# Patient Record
Sex: FEMALE | Race: WHITE | NOT HISPANIC OR LATINO | Employment: OTHER | URBAN - NONMETROPOLITAN AREA
[De-identification: names, ages, dates, MRNs, and addresses within clinical notes are randomized per-mention and may not be internally consistent; named-entity substitution may affect disease eponyms.]

---

## 2023-01-01 ENCOUNTER — APPOINTMENT (OUTPATIENT)
Dept: GENERAL RADIOLOGY | Facility: HOSPITAL | Age: 68
DRG: 871 | End: 2023-01-01
Payer: MEDICARE

## 2023-01-01 ENCOUNTER — APPOINTMENT (OUTPATIENT)
Dept: CARDIOLOGY | Facility: HOSPITAL | Age: 68
DRG: 871 | End: 2023-01-01
Payer: MEDICARE

## 2023-01-01 ENCOUNTER — APPOINTMENT (OUTPATIENT)
Dept: CT IMAGING | Facility: HOSPITAL | Age: 68
DRG: 871 | End: 2023-01-01
Payer: MEDICARE

## 2023-01-01 ENCOUNTER — APPOINTMENT (OUTPATIENT)
Dept: ULTRASOUND IMAGING | Facility: HOSPITAL | Age: 68
DRG: 871 | End: 2023-01-01
Payer: MEDICARE

## 2023-01-01 ENCOUNTER — HOSPITAL ENCOUNTER (INPATIENT)
Facility: HOSPITAL | Age: 68
LOS: 2 days | DRG: 871 | End: 2023-05-26
Attending: FAMILY MEDICINE | Admitting: FAMILY MEDICINE
Payer: MEDICARE

## 2023-01-01 VITALS
SYSTOLIC BLOOD PRESSURE: 49 MMHG | DIASTOLIC BLOOD PRESSURE: 29 MMHG | BODY MASS INDEX: 20.04 KG/M2 | TEMPERATURE: 99.3 F | OXYGEN SATURATION: 100 % | WEIGHT: 117.4 LBS | HEIGHT: 64 IN | RESPIRATION RATE: 33 BRPM

## 2023-01-01 DIAGNOSIS — R77.8 ELEVATED TROPONIN: ICD-10-CM

## 2023-01-01 DIAGNOSIS — J96.00 SEPSIS WITH ACUTE RESPIRATORY FAILURE WITHOUT SEPTIC SHOCK, DUE TO UNSPECIFIED ORGANISM, UNSPECIFIED WHETHER HYPOXIA OR HYPERCAPNIA PRESENT: Primary | ICD-10-CM

## 2023-01-01 DIAGNOSIS — R65.20 SEPSIS WITH ACUTE RESPIRATORY FAILURE WITHOUT SEPTIC SHOCK, DUE TO UNSPECIFIED ORGANISM, UNSPECIFIED WHETHER HYPOXIA OR HYPERCAPNIA PRESENT: Primary | ICD-10-CM

## 2023-01-01 DIAGNOSIS — R41.82 ALTERED MENTAL STATUS, UNSPECIFIED ALTERED MENTAL STATUS TYPE: ICD-10-CM

## 2023-01-01 DIAGNOSIS — R13.11 ORAL PHASE DYSPHAGIA: ICD-10-CM

## 2023-01-01 DIAGNOSIS — A41.9 SEPSIS WITH ACUTE RESPIRATORY FAILURE WITHOUT SEPTIC SHOCK, DUE TO UNSPECIFIED ORGANISM, UNSPECIFIED WHETHER HYPOXIA OR HYPERCAPNIA PRESENT: Primary | ICD-10-CM

## 2023-01-01 DIAGNOSIS — J18.9 PNEUMONIA DUE TO INFECTIOUS ORGANISM, UNSPECIFIED LATERALITY, UNSPECIFIED PART OF LUNG: ICD-10-CM

## 2023-01-01 LAB
ALBUMIN SERPL-MCNC: 3.7 G/DL (ref 3.5–5.2)
ALBUMIN/GLOB SERPL: 1.2 G/DL
ALP SERPL-CCNC: 239 U/L (ref 39–117)
ALT SERPL W P-5'-P-CCNC: 510 U/L (ref 1–33)
ANION GAP SERPL CALCULATED.3IONS-SCNC: 15 MMOL/L (ref 5–15)
ANION GAP SERPL CALCULATED.3IONS-SCNC: 26 MMOL/L (ref 5–15)
ANION GAP SERPL CALCULATED.3IONS-SCNC: 31 MMOL/L (ref 5–15)
ARTERIAL PATENCY WRIST A: ABNORMAL
AST SERPL-CCNC: 1197 U/L (ref 1–32)
ATMOSPHERIC PRESS: 749 MMHG
ATMOSPHERIC PRESS: 750 MMHG
ATMOSPHERIC PRESS: 751 MMHG
ATMOSPHERIC PRESS: 752 MMHG
ATMOSPHERIC PRESS: 753 MMHG
BACTERIA SPEC AEROBE CULT: ABNORMAL
BACTERIA UR QL AUTO: ABNORMAL /HPF
BASE EXCESS BLDA CALC-SCNC: -13 MMOL/L (ref 0–2)
BASE EXCESS BLDA CALC-SCNC: -16.3 MMOL/L (ref 0–2)
BASE EXCESS BLDA CALC-SCNC: -17.5 MMOL/L (ref 0–2)
BASE EXCESS BLDA CALC-SCNC: -7 MMOL/L (ref 0–2)
BASE EXCESS BLDV CALC-SCNC: -8.2 MMOL/L (ref 0–2)
BASOPHILS # BLD AUTO: 0.03 10*3/MM3 (ref 0–0.2)
BASOPHILS # BLD AUTO: 0.04 10*3/MM3 (ref 0–0.2)
BASOPHILS # BLD AUTO: 0.06 10*3/MM3 (ref 0–0.2)
BASOPHILS NFR BLD AUTO: 0.1 % (ref 0–1.5)
BASOPHILS NFR BLD AUTO: 0.1 % (ref 0–1.5)
BASOPHILS NFR BLD AUTO: 0.2 % (ref 0–1.5)
BDY SITE: ABNORMAL
BH CV ECHO MEAS - AO MAX PG: 4 MMHG
BH CV ECHO MEAS - AO MEAN PG: 2 MMHG
BH CV ECHO MEAS - AO ROOT DIAM: 2.5 CM
BH CV ECHO MEAS - AO V2 MAX: 100 CM/SEC
BH CV ECHO MEAS - AO V2 VTI: 16.6 CM
BH CV ECHO MEAS - AVA(I,D): 1.6 CM2
BH CV ECHO MEAS - EDV(CUBED): 221.4 ML
BH CV ECHO MEAS - EDV(MOD-SP2): 110 ML
BH CV ECHO MEAS - EDV(MOD-SP4): 155 ML
BH CV ECHO MEAS - EF(MOD-BP): 32.5 %
BH CV ECHO MEAS - EF(MOD-SP2): 27.9 %
BH CV ECHO MEAS - EF(MOD-SP4): 24.5 %
BH CV ECHO MEAS - ESV(CUBED): 177.5 ML
BH CV ECHO MEAS - ESV(MOD-SP2): 79.3 ML
BH CV ECHO MEAS - ESV(MOD-SP4): 117 ML
BH CV ECHO MEAS - FS: 7.1 %
BH CV ECHO MEAS - IVS/LVPW: 1.59 CM
BH CV ECHO MEAS - IVSD: 1.21 CM
BH CV ECHO MEAS - LA DIMENSION: 3.9 CM
BH CV ECHO MEAS - LV DIASTOLIC VOL/BSA (35-75): 99.5 CM2
BH CV ECHO MEAS - LV MASS(C)D: 245.9 GRAMS
BH CV ECHO MEAS - LV MAX PG: 2.01 MMHG
BH CV ECHO MEAS - LV MEAN PG: 1 MMHG
BH CV ECHO MEAS - LV SYSTOLIC VOL/BSA (12-30): 75.1 CM2
BH CV ECHO MEAS - LV V1 MAX: 70.8 CM/SEC
BH CV ECHO MEAS - LV V1 VTI: 10.4 CM
BH CV ECHO MEAS - LVIDD: 6.1 CM
BH CV ECHO MEAS - LVIDS: 5.6 CM
BH CV ECHO MEAS - LVOT AREA: 2.5 CM2
BH CV ECHO MEAS - LVOT DIAM: 1.8 CM
BH CV ECHO MEAS - LVPWD: 0.76 CM
BH CV ECHO MEAS - MR MAX PG: 86.1 MMHG
BH CV ECHO MEAS - MR MAX VEL: 447.8 CM/SEC
BH CV ECHO MEAS - MR MEAN PG: 77.3 MMHG
BH CV ECHO MEAS - MR MEAN VEL: 412.3 CM/SEC
BH CV ECHO MEAS - MR VTI: 157.3 CM
BH CV ECHO MEAS - MV A MAX VEL: 108.5 CM/SEC
BH CV ECHO MEAS - MV DEC TIME: 0.12 MSEC
BH CV ECHO MEAS - MV E MAX VEL: 130.5 CM/SEC
BH CV ECHO MEAS - MV E/A: 1.2
BH CV ECHO MEAS - PI END-D VEL: 110.5 CM/SEC
BH CV ECHO MEAS - SI(MOD-SP2): 19.7 ML/M2
BH CV ECHO MEAS - SI(MOD-SP4): 24.4 ML/M2
BH CV ECHO MEAS - SV(LVOT): 26.5 ML
BH CV ECHO MEAS - SV(MOD-SP2): 30.7 ML
BH CV ECHO MEAS - SV(MOD-SP4): 38 ML
BH CV ECHO MEAS - TR MAX PG: 25.2 MMHG
BH CV ECHO MEAS - TR MAX VEL: 251 CM/SEC
BILIRUB SERPL-MCNC: 3.7 MG/DL (ref 0–1.2)
BILIRUB UR QL STRIP: ABNORMAL
BODY TEMPERATURE: 37 C
BUN SERPL-MCNC: 46 MG/DL (ref 8–23)
BUN SERPL-MCNC: 50 MG/DL (ref 8–23)
BUN SERPL-MCNC: 52 MG/DL (ref 8–23)
BUN/CREAT SERPL: 43.9 (ref 7–25)
BUN/CREAT SERPL: 50 (ref 7–25)
BUN/CREAT SERPL: 67.5 (ref 7–25)
CALCIUM SPEC-SCNC: 6.5 MG/DL (ref 8.6–10.5)
CALCIUM SPEC-SCNC: 7.7 MG/DL (ref 8.6–10.5)
CALCIUM SPEC-SCNC: 8.8 MG/DL (ref 8.6–10.5)
CHLORIDE SERPL-SCNC: 100 MMOL/L (ref 98–107)
CHLORIDE SERPL-SCNC: 102 MMOL/L (ref 98–107)
CHLORIDE SERPL-SCNC: 91 MMOL/L (ref 98–107)
CK SERPL-CCNC: ABNORMAL U/L (ref 20–180)
CK SERPL-CCNC: ABNORMAL U/L (ref 20–180)
CLARITY UR: CLEAR
CO2 SERPL-SCNC: 10 MMOL/L (ref 22–29)
CO2 SERPL-SCNC: 18 MMOL/L (ref 22–29)
CO2 SERPL-SCNC: 9 MMOL/L (ref 22–29)
COLOR UR: ABNORMAL
CREAT SERPL-MCNC: 0.77 MG/DL (ref 0.57–1)
CREAT SERPL-MCNC: 0.92 MG/DL (ref 0.57–1)
CREAT SERPL-MCNC: 1.14 MG/DL (ref 0.57–1)
D DIMER PPP FEU-MCNC: 17.76 MCGFEU/ML (ref 0–0.67)
D-LACTATE SERPL-SCNC: 10.1 MMOL/L (ref 0.5–2)
D-LACTATE SERPL-SCNC: 12.1 MMOL/L (ref 0.5–2)
D-LACTATE SERPL-SCNC: 17 MMOL/L (ref 0.5–2)
D-LACTATE SERPL-SCNC: 3.6 MMOL/L (ref 0.5–2)
D-LACTATE SERPL-SCNC: 6.4 MMOL/L (ref 0.5–2)
D-LACTATE SERPL-SCNC: 7.2 MMOL/L (ref 0.5–2)
DEPRECATED RDW RBC AUTO: 58.3 FL (ref 37–54)
DEPRECATED RDW RBC AUTO: 60.2 FL (ref 37–54)
DEPRECATED RDW RBC AUTO: 62 FL (ref 37–54)
EGFRCR SERPLBLD CKD-EPI 2021: 52.9 ML/MIN/1.73
EGFRCR SERPLBLD CKD-EPI 2021: 68.4 ML/MIN/1.73
EGFRCR SERPLBLD CKD-EPI 2021: 84.7 ML/MIN/1.73
EOSINOPHIL # BLD AUTO: 0 10*3/MM3 (ref 0–0.4)
EOSINOPHIL NFR BLD AUTO: 0 % (ref 0.3–6.2)
EPAP: 6
ERYTHROCYTE [DISTWIDTH] IN BLOOD BY AUTOMATED COUNT: 21.3 % (ref 12.3–15.4)
ERYTHROCYTE [DISTWIDTH] IN BLOOD BY AUTOMATED COUNT: 21.3 % (ref 12.3–15.4)
ERYTHROCYTE [DISTWIDTH] IN BLOOD BY AUTOMATED COUNT: 21.6 % (ref 12.3–15.4)
FOLATE SERPL-MCNC: 16.5 NG/ML (ref 4.78–24.2)
GAS FLOW AIRWAY: 15 LPM
GAS FLOW AIRWAY: 15 LPM
GEN 5 2HR TROPONIN T REFLEX: 567 NG/L
GEN 5 2HR TROPONIN T REFLEX: 84 NG/L
GLOBULIN UR ELPH-MCNC: 3.1 GM/DL
GLUCOSE BLDC GLUCOMTR-MCNC: 128 MG/DL (ref 70–130)
GLUCOSE BLDC GLUCOMTR-MCNC: 59 MG/DL (ref 70–130)
GLUCOSE BLDC GLUCOMTR-MCNC: 66 MG/DL (ref 70–130)
GLUCOSE SERPL-MCNC: 116 MG/DL (ref 65–99)
GLUCOSE SERPL-MCNC: 130 MG/DL (ref 65–99)
GLUCOSE SERPL-MCNC: 16 MG/DL (ref 65–99)
GLUCOSE UR STRIP-MCNC: NEGATIVE MG/DL
GRAM STN SPEC: ABNORMAL
HCO3 BLDA-SCNC: 11.6 MMOL/L (ref 20–26)
HCO3 BLDA-SCNC: 14.2 MMOL/L (ref 20–26)
HCO3 BLDA-SCNC: 17.2 MMOL/L (ref 20–26)
HCO3 BLDA-SCNC: 9.8 MMOL/L (ref 20–26)
HCO3 BLDV-SCNC: 18.9 MMOL/L (ref 22–28)
HCT VFR BLD AUTO: 30.8 % (ref 34–46.6)
HCT VFR BLD AUTO: 32.7 % (ref 34–46.6)
HCT VFR BLD AUTO: 34 % (ref 34–46.6)
HGB BLD-MCNC: 8.1 G/DL (ref 12–15.9)
HGB BLD-MCNC: 8.7 G/DL (ref 12–15.9)
HGB BLD-MCNC: 9.5 G/DL (ref 12–15.9)
HGB UR QL STRIP.AUTO: ABNORMAL
HOLD SPECIMEN: NORMAL
HYALINE CASTS UR QL AUTO: ABNORMAL /LPF
INHALED O2 CONCENTRATION: 100 %
INHALED O2 CONCENTRATION: 80 %
INR PPP: 3.71 (ref 0.91–1.09)
IPAP: 12
IRON 24H UR-MRATE: 16 MCG/DL (ref 37–145)
IRON SATN MFR SERPL: 3 % (ref 20–50)
ISOLATED FROM: ABNORMAL
KETONES UR QL STRIP: ABNORMAL
L PNEUMO1 AG UR QL IA: NEGATIVE
LEFT ATRIUM VOLUME INDEX: 47.6 ML/M2
LEFT ATRIUM VOLUME: 74.2 ML
LEUKOCYTE ESTERASE UR QL STRIP.AUTO: NEGATIVE
LYMPHOCYTES # BLD AUTO: 0.76 10*3/MM3 (ref 0.7–3.1)
LYMPHOCYTES # BLD AUTO: 0.95 10*3/MM3 (ref 0.7–3.1)
LYMPHOCYTES # BLD AUTO: 0.98 10*3/MM3 (ref 0.7–3.1)
LYMPHOCYTES NFR BLD AUTO: 2.4 % (ref 19.6–45.3)
LYMPHOCYTES NFR BLD AUTO: 3.3 % (ref 19.6–45.3)
LYMPHOCYTES NFR BLD AUTO: 3.4 % (ref 19.6–45.3)
Lab: ABNORMAL
MAGNESIUM SERPL-MCNC: 2.3 MG/DL (ref 1.6–2.4)
MAGNESIUM SERPL-MCNC: 2.4 MG/DL (ref 1.6–2.4)
MCH RBC QN AUTO: 21.8 PG (ref 26.6–33)
MCH RBC QN AUTO: 21.9 PG (ref 26.6–33)
MCH RBC QN AUTO: 22 PG (ref 26.6–33)
MCHC RBC AUTO-ENTMCNC: 26.3 G/DL (ref 31.5–35.7)
MCHC RBC AUTO-ENTMCNC: 26.6 G/DL (ref 31.5–35.7)
MCHC RBC AUTO-ENTMCNC: 27.9 G/DL (ref 31.5–35.7)
MCV RBC AUTO: 78.9 FL (ref 79–97)
MCV RBC AUTO: 82 FL (ref 79–97)
MCV RBC AUTO: 83.2 FL (ref 79–97)
MODALITY: ABNORMAL
MONOCYTES # BLD AUTO: 1.14 10*3/MM3 (ref 0.1–0.9)
MONOCYTES # BLD AUTO: 1.23 10*3/MM3 (ref 0.1–0.9)
MONOCYTES # BLD AUTO: 1.84 10*3/MM3 (ref 0.1–0.9)
MONOCYTES NFR BLD AUTO: 3.6 % (ref 5–12)
MONOCYTES NFR BLD AUTO: 4.3 % (ref 5–12)
MONOCYTES NFR BLD AUTO: 6.4 % (ref 5–12)
MRSA DNA SPEC QL NAA+PROBE: NORMAL
NEUTROPHILS NFR BLD AUTO: 25.27 10*3/MM3 (ref 1.7–7)
NEUTROPHILS NFR BLD AUTO: 26.06 10*3/MM3 (ref 1.7–7)
NEUTROPHILS NFR BLD AUTO: 29.17 10*3/MM3 (ref 1.7–7)
NEUTROPHILS NFR BLD AUTO: 88.6 % (ref 42.7–76)
NEUTROPHILS NFR BLD AUTO: 91.2 % (ref 42.7–76)
NEUTROPHILS NFR BLD AUTO: 92.7 % (ref 42.7–76)
NITRITE UR QL STRIP: NEGATIVE
NOTIFIED BY: ABNORMAL
NOTIFIED WHO: ABNORMAL
NT-PROBNP SERPL-MCNC: ABNORMAL PG/ML (ref 0–900)
PCO2 BLDA: 28 MM HG (ref 35–45)
PCO2 BLDA: 28.8 MM HG (ref 35–45)
PCO2 BLDA: 34.9 MM HG (ref 35–45)
PCO2 BLDA: 37.4 MM HG (ref 35–45)
PCO2 BLDV: 45.3 MM HG (ref 41–51)
PCO2 TEMP ADJ BLD: 28 MM HG (ref 35–45)
PCO2 TEMP ADJ BLD: 28.8 MM HG (ref 35–45)
PCO2 TEMP ADJ BLD: 34.9 MM HG (ref 35–45)
PCO2 TEMP ADJ BLD: 37.4 MM HG (ref 35–45)
PEEP RESPIRATORY: 5 CM[H2O]
PEEP RESPIRATORY: 5 CM[H2O]
PH BLDA: 7.13 PH UNITS (ref 7.35–7.45)
PH BLDA: 7.15 PH UNITS (ref 7.35–7.45)
PH BLDA: 7.19 PH UNITS (ref 7.35–7.45)
PH BLDA: 7.38 PH UNITS (ref 7.35–7.45)
PH BLDV: 7.23 PH UNITS (ref 7.32–7.42)
PH UR STRIP.AUTO: 5.5 [PH] (ref 5–8)
PH, TEMP CORRECTED: 7.13 PH UNITS (ref 7.35–7.45)
PH, TEMP CORRECTED: 7.15 PH UNITS (ref 7.35–7.45)
PH, TEMP CORRECTED: 7.19 PH UNITS (ref 7.35–7.45)
PH, TEMP CORRECTED: 7.38 PH UNITS (ref 7.35–7.45)
PHOSPHATE SERPL-MCNC: 4.2 MG/DL (ref 2.5–4.5)
PLATELET # BLD AUTO: 136 10*3/MM3 (ref 140–450)
PLATELET # BLD AUTO: 167 10*3/MM3 (ref 140–450)
PLATELET # BLD AUTO: 85 10*3/MM3 (ref 140–450)
PMV BLD AUTO: 12.2 FL (ref 6–12)
PMV BLD AUTO: 12.3 FL (ref 6–12)
PO2 BLDA: 143 MM HG (ref 83–108)
PO2 BLDA: 67.6 MM HG (ref 83–108)
PO2 BLDA: 72.2 MM HG (ref 83–108)
PO2 BLDA: 90.6 MM HG (ref 83–108)
PO2 BLDV: 19.6 MM HG (ref 27–53)
PO2 TEMP ADJ BLD: 143 MM HG (ref 83–108)
PO2 TEMP ADJ BLD: 67.6 MM HG (ref 83–108)
PO2 TEMP ADJ BLD: 72.2 MM HG (ref 83–108)
PO2 TEMP ADJ BLD: 90.6 MM HG (ref 83–108)
POTASSIUM SERPL-SCNC: 4.8 MMOL/L (ref 3.5–5.2)
POTASSIUM SERPL-SCNC: 5.1 MMOL/L (ref 3.5–5.2)
POTASSIUM SERPL-SCNC: 6.3 MMOL/L (ref 3.5–5.2)
PROCALCITONIN SERPL-MCNC: 0.47 NG/ML (ref 0–0.25)
PROT SERPL-MCNC: 6.8 G/DL (ref 6–8.5)
PROT UR QL STRIP: ABNORMAL
PROTHROMBIN TIME: 37.3 SECONDS (ref 11.8–14.8)
PSV: 6 CMH2O
QT INTERVAL: 394 MS
QTC INTERVAL: 476 MS
RBC # BLD AUTO: 3.7 10*6/MM3 (ref 3.77–5.28)
RBC # BLD AUTO: 3.99 10*6/MM3 (ref 3.77–5.28)
RBC # BLD AUTO: 4.31 10*6/MM3 (ref 3.77–5.28)
RBC # UR STRIP: ABNORMAL /HPF
REF LAB TEST METHOD: ABNORMAL
S PNEUM AG SPEC QL LA: NEGATIVE
SAO2 % BLDCOA: 89.3 % (ref 94–99)
SAO2 % BLDCOA: 90.8 % (ref 94–99)
SAO2 % BLDCOA: 95.7 % (ref 94–99)
SAO2 % BLDCOA: >100.1 % (ref 94–99)
SAO2 % BLDCOV: 18.9 % (ref 45–75)
SET MECH RESP RATE: 14
SET MECH RESP RATE: 28
SET MECH RESP RATE: 28
SODIUM SERPL-SCNC: 131 MMOL/L (ref 136–145)
SODIUM SERPL-SCNC: 133 MMOL/L (ref 136–145)
SODIUM SERPL-SCNC: 138 MMOL/L (ref 136–145)
SP GR UR STRIP: 1.02 (ref 1–1.03)
SQUAMOUS #/AREA URNS HPF: ABNORMAL /HPF
T4 FREE SERPL-MCNC: 1.07 NG/DL (ref 0.93–1.7)
TIBC SERPL-MCNC: 492 MCG/DL (ref 298–536)
TRANSFERRIN SERPL-MCNC: 330 MG/DL (ref 200–360)
TROPONIN T DELTA: 3 NG/L
TROPONIN T DELTA: 73 NG/L
TROPONIN T SERPL HS-MCNC: 494 NG/L
TROPONIN T SERPL HS-MCNC: 81 NG/L
TSH SERPL DL<=0.05 MIU/L-ACNC: 5.34 UIU/ML (ref 0.27–4.2)
UROBILINOGEN UR QL STRIP: ABNORMAL
VENTILATOR MODE: ABNORMAL
VENTILATOR MODE: AC
VENTILATOR MODE: AC
VIT B12 BLD-MCNC: >2000 PG/ML (ref 211–946)
VT ON VENT VENT: 450 ML
VT ON VENT VENT: 450 ML
WBC # UR STRIP: ABNORMAL /HPF
WBC NRBC COR # BLD: 28.54 10*3/MM3 (ref 3.4–10.8)
WBC NRBC COR # BLD: 28.6 10*3/MM3 (ref 3.4–10.8)
WBC NRBC COR # BLD: 31.48 10*3/MM3 (ref 3.4–10.8)
WHOLE BLOOD HOLD COAG: NORMAL
WHOLE BLOOD HOLD SPECIMEN: NORMAL

## 2023-01-01 PROCEDURE — 99497 ADVNCD CARE PLAN 30 MIN: CPT | Performed by: CLINICAL NURSE SPECIALIST

## 2023-01-01 PROCEDURE — 80048 BASIC METABOLIC PNL TOTAL CA: CPT | Performed by: FAMILY MEDICINE

## 2023-01-01 PROCEDURE — 02HV33Z INSERTION OF INFUSION DEVICE INTO SUPERIOR VENA CAVA, PERCUTANEOUS APPROACH: ICD-10-PCS | Performed by: FAMILY MEDICINE

## 2023-01-01 PROCEDURE — 70450 CT HEAD/BRAIN W/O DYE: CPT

## 2023-01-01 PROCEDURE — 94799 UNLISTED PULMONARY SVC/PX: CPT

## 2023-01-01 PROCEDURE — 82550 ASSAY OF CK (CPK): CPT | Performed by: FAMILY MEDICINE

## 2023-01-01 PROCEDURE — 5A09357 ASSISTANCE WITH RESPIRATORY VENTILATION, LESS THAN 24 CONSECUTIVE HOURS, CONTINUOUS POSITIVE AIRWAY PRESSURE: ICD-10-PCS | Performed by: FAMILY MEDICINE

## 2023-01-01 PROCEDURE — 87040 BLOOD CULTURE FOR BACTERIA: CPT | Performed by: FAMILY MEDICINE

## 2023-01-01 PROCEDURE — 02HV33Z INSERTION OF INFUSION DEVICE INTO SUPERIOR VENA CAVA, PERCUTANEOUS APPROACH: ICD-10-PCS | Performed by: INTERNAL MEDICINE

## 2023-01-01 PROCEDURE — 71275 CT ANGIOGRAPHY CHEST: CPT

## 2023-01-01 PROCEDURE — 93005 ELECTROCARDIOGRAM TRACING: CPT

## 2023-01-01 PROCEDURE — 94664 DEMO&/EVAL PT USE INHALER: CPT

## 2023-01-01 PROCEDURE — 87449 NOS EACH ORGANISM AG IA: CPT | Performed by: INTERNAL MEDICINE

## 2023-01-01 PROCEDURE — 94002 VENT MGMT INPAT INIT DAY: CPT

## 2023-01-01 PROCEDURE — 82948 REAGENT STRIP/BLOOD GLUCOSE: CPT

## 2023-01-01 PROCEDURE — 25010000002 DOPAMINE PER 40 MG: Performed by: INTERNAL MEDICINE

## 2023-01-01 PROCEDURE — 83735 ASSAY OF MAGNESIUM: CPT | Performed by: FAMILY MEDICINE

## 2023-01-01 PROCEDURE — 82607 VITAMIN B-12: CPT | Performed by: FAMILY MEDICINE

## 2023-01-01 PROCEDURE — 85379 FIBRIN DEGRADATION QUANT: CPT | Performed by: FAMILY MEDICINE

## 2023-01-01 PROCEDURE — 94761 N-INVAS EAR/PLS OXIMETRY MLT: CPT

## 2023-01-01 PROCEDURE — 83605 ASSAY OF LACTIC ACID: CPT | Performed by: FAMILY MEDICINE

## 2023-01-01 PROCEDURE — 25010000002 PIPERACILLIN SOD-TAZOBACTAM PER 1 G: Performed by: FAMILY MEDICINE

## 2023-01-01 PROCEDURE — 82746 ASSAY OF FOLIC ACID SERUM: CPT | Performed by: FAMILY MEDICINE

## 2023-01-01 PROCEDURE — 25010000002 ENOXAPARIN PER 10 MG: Performed by: FAMILY MEDICINE

## 2023-01-01 PROCEDURE — 87641 MR-STAPH DNA AMP PROBE: CPT | Performed by: INTERNAL MEDICINE

## 2023-01-01 PROCEDURE — 25010000002 PROPOFOL 10 MG/ML EMULSION: Performed by: FAMILY MEDICINE

## 2023-01-01 PROCEDURE — 25010000002 MORPHINE PER 10 MG: Performed by: CLINICAL NURSE SPECIALIST

## 2023-01-01 PROCEDURE — 0 CEFEPIME PER 500 MG: Performed by: INTERNAL MEDICINE

## 2023-01-01 PROCEDURE — 82803 BLOOD GASES ANY COMBINATION: CPT

## 2023-01-01 PROCEDURE — 25010000002 AZITHROMYCIN PER 500 MG: Performed by: FAMILY MEDICINE

## 2023-01-01 PROCEDURE — 25010000002 CEFTRIAXONE PER 250 MG: Performed by: FAMILY MEDICINE

## 2023-01-01 PROCEDURE — 36600 WITHDRAWAL OF ARTERIAL BLOOD: CPT

## 2023-01-01 PROCEDURE — 84100 ASSAY OF PHOSPHORUS: CPT | Performed by: FAMILY MEDICINE

## 2023-01-01 PROCEDURE — 25010000002 VANCOMYCIN 10 G RECONSTITUTED SOLUTION: Performed by: INTERNAL MEDICINE

## 2023-01-01 PROCEDURE — 0BH17EZ INSERTION OF ENDOTRACHEAL AIRWAY INTO TRACHEA, VIA NATURAL OR ARTIFICIAL OPENING: ICD-10-PCS | Performed by: FAMILY MEDICINE

## 2023-01-01 PROCEDURE — 74018 RADEX ABDOMEN 1 VIEW: CPT

## 2023-01-01 PROCEDURE — 80053 COMPREHEN METABOLIC PANEL: CPT | Performed by: FAMILY MEDICINE

## 2023-01-01 PROCEDURE — 84484 ASSAY OF TROPONIN QUANT: CPT | Performed by: FAMILY MEDICINE

## 2023-01-01 PROCEDURE — 5A19054 RESPIRATORY VENTILATION, SINGLE, NONMECHANICAL: ICD-10-PCS | Performed by: FAMILY MEDICINE

## 2023-01-01 PROCEDURE — 5A1935Z RESPIRATORY VENTILATION, LESS THAN 24 CONSECUTIVE HOURS: ICD-10-PCS | Performed by: FAMILY MEDICINE

## 2023-01-01 PROCEDURE — 92610 EVALUATE SWALLOWING FUNCTION: CPT | Performed by: SPEECH-LANGUAGE PATHOLOGIST

## 2023-01-01 PROCEDURE — 99223 1ST HOSP IP/OBS HIGH 75: CPT | Performed by: CLINICAL NURSE SPECIALIST

## 2023-01-01 PROCEDURE — 25510000001 IOPAMIDOL PER 1 ML: Performed by: FAMILY MEDICINE

## 2023-01-01 PROCEDURE — 94003 VENT MGMT INPAT SUBQ DAY: CPT

## 2023-01-01 PROCEDURE — 85610 PROTHROMBIN TIME: CPT | Performed by: FAMILY MEDICINE

## 2023-01-01 PROCEDURE — 71045 X-RAY EXAM CHEST 1 VIEW: CPT

## 2023-01-01 PROCEDURE — C1751 CATH, INF, PER/CENT/MIDLINE: HCPCS

## 2023-01-01 PROCEDURE — 74177 CT ABD & PELVIS W/CONTRAST: CPT

## 2023-01-01 PROCEDURE — 25010000002 LORAZEPAM PER 2 MG: Performed by: CLINICAL NURSE SPECIALIST

## 2023-01-01 PROCEDURE — 25010000002 ONDANSETRON PER 1 MG: Performed by: FAMILY MEDICINE

## 2023-01-01 PROCEDURE — 93010 ELECTROCARDIOGRAM REPORT: CPT | Performed by: STUDENT IN AN ORGANIZED HEALTH CARE EDUCATION/TRAINING PROGRAM

## 2023-01-01 PROCEDURE — 93306 TTE W/DOPPLER COMPLETE: CPT

## 2023-01-01 PROCEDURE — 83880 ASSAY OF NATRIURETIC PEPTIDE: CPT | Performed by: FAMILY MEDICINE

## 2023-01-01 PROCEDURE — 84443 ASSAY THYROID STIM HORMONE: CPT | Performed by: FAMILY MEDICINE

## 2023-01-01 PROCEDURE — 93306 TTE W/DOPPLER COMPLETE: CPT | Performed by: INTERNAL MEDICINE

## 2023-01-01 PROCEDURE — C1769 GUIDE WIRE: HCPCS

## 2023-01-01 PROCEDURE — P9612 CATHETERIZE FOR URINE SPEC: HCPCS

## 2023-01-01 PROCEDURE — 85025 COMPLETE CBC W/AUTO DIFF WBC: CPT | Performed by: FAMILY MEDICINE

## 2023-01-01 PROCEDURE — 93970 EXTREMITY STUDY: CPT | Performed by: SURGERY

## 2023-01-01 PROCEDURE — 99223 1ST HOSP IP/OBS HIGH 75: CPT | Performed by: INTERNAL MEDICINE

## 2023-01-01 PROCEDURE — 99232 SBSQ HOSP IP/OBS MODERATE 35: CPT | Performed by: INTERNAL MEDICINE

## 2023-01-01 PROCEDURE — 93970 EXTREMITY STUDY: CPT

## 2023-01-01 PROCEDURE — 25510000001 PERFLUTREN 6.52 MG/ML SUSPENSION: Performed by: FAMILY MEDICINE

## 2023-01-01 PROCEDURE — 36415 COLL VENOUS BLD VENIPUNCTURE: CPT | Performed by: FAMILY MEDICINE

## 2023-01-01 PROCEDURE — 94660 CPAP INITIATION&MGMT: CPT

## 2023-01-01 PROCEDURE — 83540 ASSAY OF IRON: CPT | Performed by: FAMILY MEDICINE

## 2023-01-01 PROCEDURE — 84439 ASSAY OF FREE THYROXINE: CPT | Performed by: FAMILY MEDICINE

## 2023-01-01 PROCEDURE — 87899 AGENT NOS ASSAY W/OPTIC: CPT | Performed by: INTERNAL MEDICINE

## 2023-01-01 PROCEDURE — 99285 EMERGENCY DEPT VISIT HI MDM: CPT

## 2023-01-01 PROCEDURE — 84466 ASSAY OF TRANSFERRIN: CPT | Performed by: FAMILY MEDICINE

## 2023-01-01 PROCEDURE — 84145 PROCALCITONIN (PCT): CPT | Performed by: FAMILY MEDICINE

## 2023-01-01 PROCEDURE — 81001 URINALYSIS AUTO W/SCOPE: CPT | Performed by: FAMILY MEDICINE

## 2023-01-01 PROCEDURE — 31500 INSERT EMERGENCY AIRWAY: CPT | Performed by: FAMILY MEDICINE

## 2023-01-01 RX ORDER — MORPHINE SULFATE 2 MG/ML
2 INJECTION, SOLUTION INTRAMUSCULAR; INTRAVENOUS ONCE
Status: COMPLETED | OUTPATIENT
Start: 2023-01-01 | End: 2023-01-01

## 2023-01-01 RX ORDER — SODIUM CHLORIDE 9 MG/ML
50 INJECTION, SOLUTION INTRAVENOUS CONTINUOUS
Status: DISCONTINUED | OUTPATIENT
Start: 2023-01-01 | End: 2023-01-01

## 2023-01-01 RX ORDER — BISACODYL 5 MG/1
5 TABLET, DELAYED RELEASE ORAL DAILY PRN
Status: DISCONTINUED | OUTPATIENT
Start: 2023-01-01 | End: 2023-01-01

## 2023-01-01 RX ORDER — NOREPINEPHRINE BITARTRATE 0.03 MG/ML
.02-.3 INJECTION, SOLUTION INTRAVENOUS
Status: DISCONTINUED | OUTPATIENT
Start: 2023-01-01 | End: 2023-01-01

## 2023-01-01 RX ORDER — SODIUM CHLORIDE 0.9 % (FLUSH) 0.9 %
10 SYRINGE (ML) INJECTION EVERY 12 HOURS SCHEDULED
Status: DISCONTINUED | OUTPATIENT
Start: 2023-01-01 | End: 2023-01-01 | Stop reason: HOSPADM

## 2023-01-01 RX ORDER — LORAZEPAM 2 MG/ML
1 INJECTION INTRAMUSCULAR
Status: DISCONTINUED | OUTPATIENT
Start: 2023-01-01 | End: 2023-01-01 | Stop reason: HOSPADM

## 2023-01-01 RX ORDER — ESCITALOPRAM OXALATE 10 MG/1
10 TABLET ORAL DAILY
COMMUNITY

## 2023-01-01 RX ORDER — CHLORHEXIDINE GLUCONATE 0.12 MG/ML
15 RINSE ORAL EVERY 12 HOURS SCHEDULED
Status: DISCONTINUED | OUTPATIENT
Start: 2023-01-01 | End: 2023-01-01

## 2023-01-01 RX ORDER — HALOPERIDOL 5 MG/ML
1 INJECTION INTRAMUSCULAR EVERY 4 HOURS PRN
Status: DISCONTINUED | OUTPATIENT
Start: 2023-01-01 | End: 2023-01-01 | Stop reason: HOSPADM

## 2023-01-01 RX ORDER — DOPAMINE HYDROCHLORIDE 160 MG/100ML
2-20 INJECTION, SOLUTION INTRAVENOUS
Status: DISCONTINUED | OUTPATIENT
Start: 2023-01-01 | End: 2023-01-01

## 2023-01-01 RX ORDER — HALOPERIDOL 2 MG/ML
1 SOLUTION ORAL EVERY 4 HOURS PRN
Status: DISCONTINUED | OUTPATIENT
Start: 2023-01-01 | End: 2023-01-01 | Stop reason: HOSPADM

## 2023-01-01 RX ORDER — METHOCARBAMOL 500 MG/1
1000 TABLET, FILM COATED ORAL 2 TIMES DAILY
COMMUNITY

## 2023-01-01 RX ORDER — CHLORHEXIDINE GLUCONATE 500 MG/1
1 CLOTH TOPICAL ONCE
Status: COMPLETED | OUTPATIENT
Start: 2023-01-01 | End: 2023-01-01

## 2023-01-01 RX ORDER — SODIUM CHLORIDE 0.9 % (FLUSH) 0.9 %
10 SYRINGE (ML) INJECTION AS NEEDED
Status: DISCONTINUED | OUTPATIENT
Start: 2023-01-01 | End: 2023-01-01 | Stop reason: HOSPADM

## 2023-01-01 RX ORDER — DIPHENOXYLATE HYDROCHLORIDE AND ATROPINE SULFATE 2.5; .025 MG/1; MG/1
1 TABLET ORAL
Status: DISCONTINUED | OUTPATIENT
Start: 2023-01-01 | End: 2023-01-01 | Stop reason: HOSPADM

## 2023-01-01 RX ORDER — ACETAMINOPHEN 650 MG/1
650 SUPPOSITORY RECTAL EVERY 4 HOURS PRN
Status: DISCONTINUED | OUTPATIENT
Start: 2023-01-01 | End: 2023-01-01 | Stop reason: HOSPADM

## 2023-01-01 RX ORDER — DIPHENHYDRAMINE HCL 25 MG
25 CAPSULE ORAL EVERY 6 HOURS PRN
Status: DISCONTINUED | OUTPATIENT
Start: 2023-01-01 | End: 2023-01-01 | Stop reason: HOSPADM

## 2023-01-01 RX ORDER — PROCHLORPERAZINE MALEATE 10 MG
5 TABLET ORAL EVERY 6 HOURS PRN
Status: DISCONTINUED | OUTPATIENT
Start: 2023-01-01 | End: 2023-01-01 | Stop reason: HOSPADM

## 2023-01-01 RX ORDER — IPRATROPIUM BROMIDE AND ALBUTEROL SULFATE 2.5; .5 MG/3ML; MG/3ML
3 SOLUTION RESPIRATORY (INHALATION)
COMMUNITY

## 2023-01-01 RX ORDER — DEXTROSE MONOHYDRATE 25 G/50ML
INJECTION, SOLUTION INTRAVENOUS
Status: COMPLETED
Start: 2023-01-01 | End: 2023-01-01

## 2023-01-01 RX ORDER — SCOLOPAMINE TRANSDERMAL SYSTEM 1 MG/1
1 PATCH, EXTENDED RELEASE TRANSDERMAL
Status: DISCONTINUED | OUTPATIENT
Start: 2023-01-01 | End: 2023-01-01 | Stop reason: HOSPADM

## 2023-01-01 RX ORDER — PROCHLORPERAZINE EDISYLATE 5 MG/ML
5 INJECTION INTRAMUSCULAR; INTRAVENOUS EVERY 6 HOURS PRN
Status: DISCONTINUED | OUTPATIENT
Start: 2023-01-01 | End: 2023-01-01 | Stop reason: HOSPADM

## 2023-01-01 RX ORDER — LORAZEPAM 2 MG/ML
0.5 INJECTION INTRAMUSCULAR ONCE
Status: COMPLETED | OUTPATIENT
Start: 2023-01-01 | End: 2023-01-01

## 2023-01-01 RX ORDER — AMITRIPTYLINE HYDROCHLORIDE 50 MG/1
50 TABLET, FILM COATED ORAL NIGHTLY
COMMUNITY

## 2023-01-01 RX ORDER — PROCHLORPERAZINE 25 MG
25 SUPPOSITORY, RECTAL RECTAL EVERY 12 HOURS PRN
Status: DISCONTINUED | OUTPATIENT
Start: 2023-01-01 | End: 2023-01-01 | Stop reason: HOSPADM

## 2023-01-01 RX ORDER — SODIUM CHLORIDE 0.9 % (FLUSH) 0.9 %
10 SYRINGE (ML) INJECTION AS NEEDED
Status: DISCONTINUED | OUTPATIENT
Start: 2023-01-01 | End: 2023-01-01 | Stop reason: SDUPTHER

## 2023-01-01 RX ORDER — ONDANSETRON 2 MG/ML
4 INJECTION INTRAMUSCULAR; INTRAVENOUS EVERY 6 HOURS PRN
Status: DISCONTINUED | OUTPATIENT
Start: 2023-01-01 | End: 2023-01-01 | Stop reason: HOSPADM

## 2023-01-01 RX ORDER — FUROSEMIDE 10 MG/ML
20 INJECTION INTRAMUSCULAR; INTRAVENOUS EVERY 6 HOURS PRN
Status: DISCONTINUED | OUTPATIENT
Start: 2023-01-01 | End: 2023-01-01 | Stop reason: HOSPADM

## 2023-01-01 RX ORDER — NOREPINEPHRINE BITARTRATE 0.03 MG/ML
INJECTION, SOLUTION INTRAVENOUS
Status: COMPLETED
Start: 2023-01-01 | End: 2023-01-01

## 2023-01-01 RX ORDER — ENOXAPARIN SODIUM 100 MG/ML
1 INJECTION SUBCUTANEOUS EVERY 12 HOURS
Status: DISCONTINUED | OUTPATIENT
Start: 2023-01-01 | End: 2023-01-01

## 2023-01-01 RX ORDER — NITROGLYCERIN 0.4 MG/1
0.4 TABLET SUBLINGUAL
Status: DISCONTINUED | OUTPATIENT
Start: 2023-01-01 | End: 2023-01-01

## 2023-01-01 RX ORDER — MORPHINE SULFATE 20 MG/ML
10 SOLUTION ORAL
Status: DISCONTINUED | OUTPATIENT
Start: 2023-01-01 | End: 2023-01-01 | Stop reason: HOSPADM

## 2023-01-01 RX ORDER — ACETAMINOPHEN 325 MG/1
650 TABLET ORAL EVERY 4 HOURS PRN
Status: DISCONTINUED | OUTPATIENT
Start: 2023-01-01 | End: 2023-01-01 | Stop reason: HOSPADM

## 2023-01-01 RX ORDER — SODIUM CHLORIDE 9 MG/ML
40 INJECTION, SOLUTION INTRAVENOUS AS NEEDED
Status: DISCONTINUED | OUTPATIENT
Start: 2023-01-01 | End: 2023-01-01 | Stop reason: HOSPADM

## 2023-01-01 RX ORDER — ALBUTEROL SULFATE 2.5 MG/3ML
2.5 SOLUTION RESPIRATORY (INHALATION) EVERY 6 HOURS PRN
Status: DISCONTINUED | OUTPATIENT
Start: 2023-01-01 | End: 2023-01-01

## 2023-01-01 RX ORDER — ALBUTEROL SULFATE 2.5 MG/3ML
2.5 SOLUTION RESPIRATORY (INHALATION)
Status: DISCONTINUED | OUTPATIENT
Start: 2023-01-01 | End: 2023-01-01 | Stop reason: SDUPTHER

## 2023-01-01 RX ORDER — BISACODYL 10 MG
10 SUPPOSITORY, RECTAL RECTAL DAILY PRN
Status: DISCONTINUED | OUTPATIENT
Start: 2023-01-01 | End: 2023-01-01 | Stop reason: HOSPADM

## 2023-01-01 RX ORDER — DIPHENHYDRAMINE HYDROCHLORIDE 50 MG/ML
25 INJECTION INTRAMUSCULAR; INTRAVENOUS EVERY 6 HOURS PRN
Status: DISCONTINUED | OUTPATIENT
Start: 2023-01-01 | End: 2023-01-01 | Stop reason: HOSPADM

## 2023-01-01 RX ORDER — ENOXAPARIN SODIUM 100 MG/ML
40 INJECTION SUBCUTANEOUS EVERY 24 HOURS
Status: DISCONTINUED | OUTPATIENT
Start: 2023-01-01 | End: 2023-01-01

## 2023-01-01 RX ORDER — BISACODYL 10 MG
10 SUPPOSITORY, RECTAL RECTAL DAILY PRN
Status: DISCONTINUED | OUTPATIENT
Start: 2023-01-01 | End: 2023-01-01

## 2023-01-01 RX ORDER — MECLIZINE HYDROCHLORIDE 25 MG/1
25 TABLET ORAL 2 TIMES DAILY
COMMUNITY

## 2023-01-01 RX ORDER — LORAZEPAM 2 MG/ML
2 INJECTION INTRAMUSCULAR
Status: DISCONTINUED | OUTPATIENT
Start: 2023-01-01 | End: 2023-01-01 | Stop reason: HOSPADM

## 2023-01-01 RX ORDER — BUDESONIDE 0.5 MG/2ML
0.5 INHALANT ORAL
Status: DISCONTINUED | OUTPATIENT
Start: 2023-01-01 | End: 2023-01-01 | Stop reason: SDUPTHER

## 2023-01-01 RX ORDER — FAMOTIDINE 10 MG/ML
20 INJECTION, SOLUTION INTRAVENOUS 2 TIMES DAILY
Status: DISCONTINUED | OUTPATIENT
Start: 2023-01-01 | End: 2023-01-01

## 2023-01-01 RX ORDER — HALOPERIDOL 5 MG/ML
2 INJECTION INTRAMUSCULAR EVERY 4 HOURS PRN
Status: DISCONTINUED | OUTPATIENT
Start: 2023-01-01 | End: 2023-01-01 | Stop reason: HOSPADM

## 2023-01-01 RX ORDER — POLYETHYLENE GLYCOL 3350 17 G/17G
17 POWDER, FOR SOLUTION ORAL DAILY PRN
Status: DISCONTINUED | OUTPATIENT
Start: 2023-01-01 | End: 2023-01-01

## 2023-01-01 RX ORDER — ATORVASTATIN CALCIUM 20 MG/1
20 TABLET, FILM COATED ORAL NIGHTLY
COMMUNITY

## 2023-01-01 RX ORDER — LEVOTHYROXINE SODIUM 0.1 MG/1
100 TABLET ORAL
Status: DISCONTINUED | OUTPATIENT
Start: 2023-01-01 | End: 2023-01-01

## 2023-01-01 RX ORDER — ETOMIDATE 2 MG/ML
INJECTION INTRAVENOUS
Status: COMPLETED | OUTPATIENT
Start: 2023-01-01 | End: 2023-01-01

## 2023-01-01 RX ORDER — LIDOCAINE HYDROCHLORIDE 10 MG/ML
10 INJECTION, SOLUTION INFILTRATION; PERINEURAL ONCE
Status: COMPLETED | OUTPATIENT
Start: 2023-01-01 | End: 2023-01-01

## 2023-01-01 RX ORDER — HALOPERIDOL 2 MG/ML
2 SOLUTION ORAL EVERY 4 HOURS PRN
Status: DISCONTINUED | OUTPATIENT
Start: 2023-01-01 | End: 2023-01-01 | Stop reason: HOSPADM

## 2023-01-01 RX ORDER — IPRATROPIUM BROMIDE AND ALBUTEROL SULFATE 2.5; .5 MG/3ML; MG/3ML
3 SOLUTION RESPIRATORY (INHALATION) EVERY 4 HOURS PRN
Status: DISCONTINUED | OUTPATIENT
Start: 2023-01-01 | End: 2023-01-01 | Stop reason: HOSPADM

## 2023-01-01 RX ORDER — ATROPINE SULFATE 10 MG/ML
2 SOLUTION/ DROPS OPHTHALMIC 2 TIMES DAILY PRN
Status: DISCONTINUED | OUTPATIENT
Start: 2023-01-01 | End: 2023-01-01 | Stop reason: HOSPADM

## 2023-01-01 RX ORDER — MORPHINE SULFATE 20 MG/ML
20 SOLUTION ORAL
Status: DISCONTINUED | OUTPATIENT
Start: 2023-01-01 | End: 2023-01-01 | Stop reason: HOSPADM

## 2023-01-01 RX ORDER — ACETAMINOPHEN 160 MG/5ML
650 SOLUTION ORAL EVERY 4 HOURS PRN
Status: DISCONTINUED | OUTPATIENT
Start: 2023-01-01 | End: 2023-01-01 | Stop reason: HOSPADM

## 2023-01-01 RX ORDER — LORAZEPAM 2 MG/ML
1 CONCENTRATE ORAL
Status: DISCONTINUED | OUTPATIENT
Start: 2023-01-01 | End: 2023-01-01 | Stop reason: HOSPADM

## 2023-01-01 RX ORDER — FAMOTIDINE 10 MG/ML
20 INJECTION, SOLUTION INTRAVENOUS DAILY
Status: DISCONTINUED | OUTPATIENT
Start: 2023-05-27 | End: 2023-01-01

## 2023-01-01 RX ORDER — LORAZEPAM 2 MG/ML
2 CONCENTRATE ORAL
Status: DISCONTINUED | OUTPATIENT
Start: 2023-01-01 | End: 2023-01-01 | Stop reason: HOSPADM

## 2023-01-01 RX ORDER — GABAPENTIN 300 MG/1
300 CAPSULE ORAL 3 TIMES DAILY
COMMUNITY

## 2023-01-01 RX ORDER — SODIUM CHLORIDE 0.9 % (FLUSH) 0.9 %
20 SYRINGE (ML) INJECTION AS NEEDED
Status: DISCONTINUED | OUTPATIENT
Start: 2023-01-01 | End: 2023-01-01 | Stop reason: HOSPADM

## 2023-01-01 RX ORDER — AMOXICILLIN 250 MG
2 CAPSULE ORAL 2 TIMES DAILY
Status: DISCONTINUED | OUTPATIENT
Start: 2023-01-01 | End: 2023-01-01

## 2023-01-01 RX ORDER — LEVOTHYROXINE SODIUM 0.1 MG/1
100 TABLET ORAL DAILY
COMMUNITY

## 2023-01-01 RX ORDER — PANTOPRAZOLE SODIUM 40 MG/1
40 TABLET, DELAYED RELEASE ORAL DAILY
COMMUNITY

## 2023-01-01 RX ORDER — NITROGLYCERIN 0.4 MG/1
0.4 TABLET SUBLINGUAL
COMMUNITY

## 2023-01-01 RX ORDER — CHLORHEXIDINE GLUCONATE 500 MG/1
1 CLOTH TOPICAL EVERY 24 HOURS
Status: DISCONTINUED | OUTPATIENT
Start: 2023-01-01 | End: 2023-01-01 | Stop reason: HOSPADM

## 2023-01-01 RX ADMIN — LEVOTHYROXINE SODIUM 100 MCG: 100 TABLET ORAL at 05:48

## 2023-01-01 RX ADMIN — Medication 10 ML: at 14:39

## 2023-01-01 RX ADMIN — ALBUTEROL SULFATE 1.25 MG: 2.5 SOLUTION RESPIRATORY (INHALATION) at 14:10

## 2023-01-01 RX ADMIN — ENOXAPARIN SODIUM 50 MG: 100 INJECTION SUBCUTANEOUS at 17:04

## 2023-01-01 RX ADMIN — IPRATROPIUM BROMIDE 0.5 MG: 0.5 SOLUTION RESPIRATORY (INHALATION) at 06:01

## 2023-01-01 RX ADMIN — DOPAMINE HYDROCHLORIDE 20 MCG/KG/MIN: 160 INJECTION, SOLUTION INTRAVENOUS at 11:47

## 2023-01-01 RX ADMIN — CHLORHEXIDINE GLUCONATE 15 ML: 1.2 RINSE ORAL at 22:27

## 2023-01-01 RX ADMIN — AZITHROMYCIN 500 MG: 500 INJECTION, POWDER, LYOPHILIZED, FOR SOLUTION INTRAVENOUS at 17:04

## 2023-01-01 RX ADMIN — SODIUM CHLORIDE 75 ML/HR: 9 INJECTION, SOLUTION INTRAVENOUS at 16:39

## 2023-01-01 RX ADMIN — CHLORHEXIDINE GLUCONATE 15 ML: 1.2 RINSE ORAL at 08:58

## 2023-01-01 RX ADMIN — DOPAMINE HYDROCHLORIDE 20 MCG/KG/MIN: 160 INJECTION, SOLUTION INTRAVENOUS at 05:36

## 2023-01-01 RX ADMIN — ALBUTEROL SULFATE 2.5 MG: 2.5 SOLUTION RESPIRATORY (INHALATION) at 18:54

## 2023-01-01 RX ADMIN — SODIUM CHLORIDE 50 ML/HR: 9 INJECTION, SOLUTION INTRAVENOUS at 21:43

## 2023-01-01 RX ADMIN — ETOMIDATE 10 MG: 2 INJECTION, SOLUTION INTRAVENOUS at 18:14

## 2023-01-01 RX ADMIN — ALBUTEROL SULFATE 2.5 MG: 2.5 SOLUTION RESPIRATORY (INHALATION) at 05:58

## 2023-01-01 RX ADMIN — CEFEPIME 2 G: 2 INJECTION, POWDER, FOR SOLUTION INTRAVENOUS at 08:41

## 2023-01-01 RX ADMIN — CEFEPIME 2 G: 2 INJECTION, POWDER, FOR SOLUTION INTRAVENOUS at 20:52

## 2023-01-01 RX ADMIN — ALBUTEROL SULFATE 1.25 MG: 2.5 SOLUTION RESPIRATORY (INHALATION) at 06:00

## 2023-01-01 RX ADMIN — SODIUM BICARBONATE 50 MEQ: 84 INJECTION INTRAVENOUS at 11:04

## 2023-01-01 RX ADMIN — DOCUSATE SODIUM 50 MG AND SENNOSIDES 8.6 MG 2 TABLET: 8.6; 5 TABLET, FILM COATED ORAL at 08:17

## 2023-01-01 RX ADMIN — IPRATROPIUM BROMIDE 0.5 MG: 0.5 SOLUTION RESPIRATORY (INHALATION) at 10:11

## 2023-01-01 RX ADMIN — CHLORHEXIDINE GLUCONATE 1 APPLICATION: 500 CLOTH TOPICAL at 17:47

## 2023-01-01 RX ADMIN — Medication 0.3 MCG/KG/MIN: at 02:06

## 2023-01-01 RX ADMIN — ALBUTEROL SULFATE 1.25 MG: 2.5 SOLUTION RESPIRATORY (INHALATION) at 10:11

## 2023-01-01 RX ADMIN — LORAZEPAM 0.5 MG: 2 INJECTION INTRAMUSCULAR; INTRAVENOUS at 14:35

## 2023-01-01 RX ADMIN — SODIUM CHLORIDE 500 ML: 9 INJECTION, SOLUTION INTRAVENOUS at 20:40

## 2023-01-01 RX ADMIN — IOPAMIDOL 100 ML: 755 INJECTION, SOLUTION INTRAVENOUS at 13:20

## 2023-01-01 RX ADMIN — PERFLUTREN 1.17 MG: 6.52 INJECTION, SUSPENSION INTRAVENOUS at 09:02

## 2023-01-01 RX ADMIN — MUPIROCIN 1 APPLICATION: 20 OINTMENT TOPICAL at 22:14

## 2023-01-01 RX ADMIN — Medication 0.3 MCG/KG/MIN: at 18:29

## 2023-01-01 RX ADMIN — Medication 10 ML: at 09:29

## 2023-01-01 RX ADMIN — MUPIROCIN 1 APPLICATION: 20 OINTMENT TOPICAL at 08:17

## 2023-01-01 RX ADMIN — SODIUM CHLORIDE 1000 ML: 9 INJECTION, SOLUTION INTRAVENOUS at 05:24

## 2023-01-01 RX ADMIN — SODIUM CHLORIDE 50 ML/HR: 9 INJECTION, SOLUTION INTRAVENOUS at 06:24

## 2023-01-01 RX ADMIN — ENOXAPARIN SODIUM 60 MG: 100 INJECTION SUBCUTANEOUS at 17:47

## 2023-01-01 RX ADMIN — MORPHINE SULFATE 2 MG: 2 INJECTION, SOLUTION INTRAMUSCULAR; INTRAVENOUS at 14:35

## 2023-01-01 RX ADMIN — DOPAMINE HYDROCHLORIDE 2 MCG/KG/MIN: 160 INJECTION, SOLUTION INTRAVENOUS at 20:41

## 2023-01-01 RX ADMIN — CEFTRIAXONE 2 G: 2 INJECTION, POWDER, FOR SOLUTION INTRAMUSCULAR; INTRAVENOUS at 16:02

## 2023-01-01 RX ADMIN — PROPOFOL 5 MCG/KG/MIN: 10 INJECTION, EMULSION INTRAVENOUS at 05:37

## 2023-01-01 RX ADMIN — Medication 10 ML: at 20:29

## 2023-01-01 RX ADMIN — PROPOFOL 5 MCG/KG/MIN: 10 INJECTION, EMULSION INTRAVENOUS at 19:33

## 2023-01-01 RX ADMIN — Medication 10 ML: at 08:19

## 2023-01-01 RX ADMIN — PIPERACILLIN AND TAZOBACTAM 4.5 G: 4; .5 INJECTION, POWDER, LYOPHILIZED, FOR SOLUTION INTRAVENOUS; PARENTERAL at 12:37

## 2023-01-01 RX ADMIN — CEFTRIAXONE 2 G: 2 INJECTION, POWDER, FOR SOLUTION INTRAMUSCULAR; INTRAVENOUS at 16:39

## 2023-01-01 RX ADMIN — DOCUSATE SODIUM 50 MG AND SENNOSIDES 8.6 MG 2 TABLET: 8.6; 5 TABLET, FILM COATED ORAL at 22:15

## 2023-01-01 RX ADMIN — Medication 0.24 MCG/KG/MIN: at 11:43

## 2023-01-01 RX ADMIN — LEVOTHYROXINE SODIUM 100 MCG: 100 TABLET ORAL at 05:56

## 2023-01-01 RX ADMIN — SODIUM BICARBONATE 50 MEQ: 84 INJECTION INTRAVENOUS at 05:15

## 2023-01-01 RX ADMIN — ALBUTEROL SULFATE 2.5 MG: 2.5 SOLUTION RESPIRATORY (INHALATION) at 00:02

## 2023-01-01 RX ADMIN — DEXTROSE MONOHYDRATE 50 ML: 25 INJECTION, SOLUTION INTRAVENOUS at 09:04

## 2023-01-01 RX ADMIN — DOCUSATE SODIUM 50 MG AND SENNOSIDES 8.6 MG 2 TABLET: 8.6; 5 TABLET, FILM COATED ORAL at 20:29

## 2023-01-01 RX ADMIN — SODIUM CHLORIDE 75 ML/HR: 9 INJECTION, SOLUTION INTRAVENOUS at 06:40

## 2023-01-01 RX ADMIN — LIDOCAINE HYDROCHLORIDE ANHYDROUS 5 ML: 10 INJECTION, SOLUTION INFILTRATION at 12:53

## 2023-01-01 RX ADMIN — MUPIROCIN 1 APPLICATION: 20 OINTMENT TOPICAL at 17:47

## 2023-01-01 RX ADMIN — MUPIROCIN 1 APPLICATION: 20 OINTMENT TOPICAL at 09:29

## 2023-01-01 RX ADMIN — IPRATROPIUM BROMIDE 0.5 MG: 0.5 SOLUTION RESPIRATORY (INHALATION) at 14:10

## 2023-01-01 RX ADMIN — CHLORHEXIDINE GLUCONATE 1 APPLICATION: 500 CLOTH TOPICAL at 04:00

## 2023-01-01 RX ADMIN — FAMOTIDINE 20 MG: 10 INJECTION INTRAVENOUS at 22:15

## 2023-01-01 RX ADMIN — SODIUM CHLORIDE 1000 ML: 9 INJECTION, SOLUTION INTRAVENOUS at 10:54

## 2023-01-01 RX ADMIN — VANCOMYCIN HYDROCHLORIDE 750 MG: 10 INJECTION, POWDER, LYOPHILIZED, FOR SOLUTION INTRAVENOUS at 08:57

## 2023-01-01 RX ADMIN — LEVOTHYROXINE SODIUM 100 MCG: 100 TABLET ORAL at 18:40

## 2023-01-01 RX ADMIN — VANCOMYCIN HYDROCHLORIDE 750 MG: 10 INJECTION, POWDER, LYOPHILIZED, FOR SOLUTION INTRAVENOUS at 22:32

## 2023-01-01 RX ADMIN — FAMOTIDINE 20 MG: 10 INJECTION INTRAVENOUS at 08:17

## 2023-01-01 RX ADMIN — SODIUM BICARBONATE 50 MEQ: 84 INJECTION INTRAVENOUS at 22:08

## 2023-01-01 RX ADMIN — AZITHROMYCIN 500 MG: 500 INJECTION, POWDER, LYOPHILIZED, FOR SOLUTION INTRAVENOUS at 17:46

## 2023-01-01 RX ADMIN — ONDANSETRON 4 MG: 2 INJECTION INTRAMUSCULAR; INTRAVENOUS at 18:41

## 2023-01-01 RX ADMIN — SODIUM BICARBONATE: 84 INJECTION, SOLUTION INTRAVENOUS at 11:55

## 2023-01-01 RX ADMIN — ALBUTEROL SULFATE 2.5 MG: 2.5 SOLUTION RESPIRATORY (INHALATION) at 12:41

## 2023-01-01 RX ADMIN — Medication 10 ML: at 20:24

## 2023-01-01 RX ADMIN — MORPHINE SULFATE 4 MG: 4 INJECTION, SOLUTION INTRAMUSCULAR; INTRAVENOUS at 14:39

## 2023-01-01 RX ADMIN — CHLORHEXIDINE GLUCONATE 1 APPLICATION: 500 CLOTH TOPICAL at 03:49

## 2023-01-01 RX ADMIN — ENOXAPARIN SODIUM 60 MG: 100 INJECTION SUBCUTANEOUS at 06:38

## 2023-05-24 PROBLEM — A41.9: Status: ACTIVE | Noted: 2023-01-01

## 2023-05-24 PROBLEM — J96.00: Status: ACTIVE | Noted: 2023-01-01

## 2023-05-24 PROBLEM — I34.0 NONRHEUMATIC MITRAL VALVE INSUFFICIENCY: Status: ACTIVE | Noted: 2023-01-01

## 2023-05-24 PROBLEM — M62.82 RHABDOMYOLYSIS: Status: ACTIVE | Noted: 2023-01-01

## 2023-05-24 PROBLEM — E03.9 HYPOTHYROIDISM (ACQUIRED): Status: ACTIVE | Noted: 2023-01-01

## 2023-05-24 PROBLEM — R65.20: Status: ACTIVE | Noted: 2023-01-01

## 2023-05-24 PROBLEM — J18.9 CAP (COMMUNITY ACQUIRED PNEUMONIA): Status: ACTIVE | Noted: 2023-01-01

## 2023-05-24 NOTE — H&P
[facility]  HISTORY AND PHYSICAL    Date of Admission: 5/24/2023  Primary Care Physician: Josué Galicia MD    Subjective   Primary Historian: POA    Chief Complaint: Lethargy    History of Present Illness  68 year old female with PMH of HTN, hypothyroidism, COPD, that presents to the ER by EMS due to hypoxemia and AMS. She has not been seen for about 7 days and POA asked for a well visit. She was found sitting in her chair staring and listless. Oxygen was low at about 60%. Arrived to the ER hypoxemic, acidotic and poorly responsive.     Review of Systems   Otherwise complete ROS reviewed and negative except as mentioned in the HPI.    Past Medical History: Patient unable to provide.   Based on her home medications and imaging > Hypothyroidism, COPD    Past Surgical History: Patient unable to provide    Social History:  Patient unable to provide. 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     Family History: Patient unable to provide     Allergies:  Patient unable to provide    Medications:  Prior to Admission medications    Medication Sig Start Date End Date Taking? Authorizing Provider   amitriptyline (ELAVIL) 50 MG tablet Take 1 tablet by mouth Every Night.    ProviderTor MD   atorvastatin (LIPITOR) 20 MG tablet Take 1 tablet by mouth Every Night.    Tor Ly MD   escitalopram (LEXAPRO) 10 MG tablet Take 1 tablet by mouth Daily.    Tor Ly MD   gabapentin (NEURONTIN) 300 MG capsule Take 1 capsule by mouth 3 (Three) Times a Day.    Tor Ly MD   ipratropium-albuterol (DUO-NEB) 0.5-2.5 mg/3 ml nebulizer Take 3 mL by nebulization Every 6 (Six) Hours As Needed for Wheezing or Shortness of Air.    Tor Ly MD   levothyroxine (SYNTHROID, LEVOTHROID) 100 MCG tablet Take 1 tablet by mouth Daily.    Tor Ly MD   meclizine (ANTIVERT) 25 MG tablet Take 1 tablet by mouth 2 (Two)  "Times a Day.    Tor Ly MD   methocarbamol (ROBAXIN) 500 MG tablet Take 2 tablets by mouth 2 (Two) Times a Day.    Tor Ly MD   nitroglycerin (NITROSTAT) 0.4 MG SL tablet Place 1 tablet under the tongue Every 5 (Five) Minutes As Needed for Chest Pain. Take no more than 3 doses in 15 minutes.    Tor Ly MD   pantoprazole (PROTONIX) 40 MG EC tablet Take 1 tablet by mouth Daily.    ProviderTor MD       I have utilized all available immediate resources to obtain, update, or review the patient's current medications (including all prescriptions, over-the-counter products, herbals, cannabis/cannabidiol products, and vitamin/mineral/dietary (nutritional) supplements).    Objective     Vital Signs: /53   Pulse 97   Temp 98.5 °F (36.9 °C) (Rectal)   Resp 18   Ht 162.6 cm (64\")   Wt 60 kg (132 lb 4.4 oz)   SpO2 90%   BMI 22.71 kg/m²   Physical Exam  Vitals reviewed.   Constitutional:       General: She is not in acute distress.     Appearance: She is well-developed. She is ill-appearing and toxic-appearing.   HENT:      Head: Normocephalic and atraumatic.      Right Ear: External ear normal.      Left Ear: External ear normal.      Mouth/Throat:      Mouth: Mucous membranes are dry.      Pharynx: Oropharynx is clear.   Eyes:      General:         Right eye: No discharge.         Left eye: No discharge.      Extraocular Movements: Extraocular movements intact.      Conjunctiva/sclera: Conjunctivae normal.      Pupils: Pupils are equal, round, and reactive to light.   Neck:      Vascular: No JVD.   Cardiovascular:      Rate and Rhythm: Regular rhythm. Tachycardia present.      Pulses: Normal pulses.      Heart sounds: Murmur heard.     No friction rub.   Pulmonary:      Effort: Respiratory distress present.      Breath sounds: No stridor. Rales present. No wheezing or rhonchi.   Chest:      Chest wall: No tenderness.   Abdominal:      General: Bowel sounds are " normal. There is no distension.      Palpations: Abdomen is soft.      Tenderness: There is no guarding or rebound.      Hernia: No hernia is present.   Musculoskeletal:         General: No swelling, tenderness or deformity. Normal range of motion.      Cervical back: Normal range of motion and neck supple. No rigidity or tenderness. No muscular tenderness.      Right lower leg: Edema present.      Left lower leg: Edema present.   Skin:     General: Skin is dry.      Capillary Refill: Capillary refill takes less than 2 seconds.      Coloration: Skin is pale.      Findings: No erythema or rash.   Neurological:      General: No focal deficit present.      Mental Status: She is disoriented.      Cranial Nerves: No cranial nerve deficit.      Sensory: No sensory deficit.      Motor: No weakness or abnormal muscle tone.      Deep Tendon Reflexes: Reflexes normal.      Results Reviewed:  Lab Results (last 24 hours)       Procedure Component Value Units Date/Time    [place] Draw [[id number]] Collected: 05/24/23 1024    Specimen: Blood Updated: 05/24/23 1430    Narrative:      The following orders were created for panel order [place] Draw.  Procedure                               Abnormality         Status                     ---------                               -----------         ------                     Green Top (Gel)[[id number]]                                  Final result               Lavender Top[[id number]]                                     Final result               Red Top[[id number]]                                          Final result               Shi Top[[id number]]                                         Final result               Light Blue Top[[id number]]                                   Final result                 Please view results for these tests on the individual orders.    Gray Top [[id number]] Collected: 05/24/23 1024    Specimen: Blood Updated: 05/24/23 1430     Extra Tube Hold for add-ons.      Comment: Auto resulted.       STAT Lactic Acid, Reflex [[id number]]  (Abnormal) Collected: 05/24/23 1324    Specimen: Blood Updated: 05/24/23 1353     Lactate 12.1 mmol/L     High Sensitivity Troponin T 2Hr [[id number]]  (Abnormal) Collected: 05/24/23 1245    Specimen: Blood Updated: 05/24/23 1319     HS Troponin T 84 ng/L      Troponin T Delta 3 ng/L     Narrative:      High Sensitive Troponin T Reference Range:  <10.0 ng/L- Negative Female for AMI  <15.0 ng/L- Negative Male for AMI  >=10 - Abnormal Female indicating possible myocardial injury.  >=15 - Abnormal Male indicating possible myocardial injury.   Clinicians would have to utilize clinical acumen, EKG, Troponin, and serial changes to determine if it is an Acute Myocardial Infarction or myocardial injury due to an underlying chronic condition.         Green Top (Gel) [[id number]] Collected: 05/24/23 1024    Specimen: Blood Updated: 05/24/23 1131     Extra Tube Hold for add-ons.     Comment: Auto resulted.       Lavender Top [[id number]] Collected: 05/24/23 1024    Specimen: Blood Updated: 05/24/23 1131     Extra Tube hold for add-on     Comment: Auto resulted       Red Top [[id number]] Collected: 05/24/23 1024    Specimen: Blood Updated: 05/24/23 1131     Extra Tube Hold for add-ons.     Comment: Auto resulted.       Light Blue Top [[id number]] Collected: 05/24/23 1024    Specimen: Blood Updated: 05/24/23 1131     Extra Tube Hold for add-ons.     Comment: Auto resulted       CK [[id number]]  (Abnormal) Collected: 05/24/23 1024    Specimen: Blood Updated: 05/24/23 1126     Creatine Kinase 10,760 U/L     Comprehensive Metabolic Panel [[id number]]  (Abnormal) Collected: 05/24/23 1024    Specimen: Blood Updated: 05/24/23 1124     Glucose 130 mg/dL      BUN 46 mg/dL      Creatinine 0.92 mg/dL      Sodium 131 mmol/L      Potassium 5.1 mmol/L      Comment: Slight hemolysis detected by analyzer. Results may be affected.        Chloride 91 mmol/L      CO2 9.0 mmol/L       Calcium 8.8 mg/dL      Total Protein 6.8 g/dL      Albumin 3.7 g/dL      ALT (SGPT) 510 U/L      AST (SGOT) 1,197 U/L      Alkaline Phosphatase 239 U/L      Total Bilirubin 3.7 mg/dL      Globulin 3.1 gm/dL      A/G Ratio 1.2 g/dL      BUN/Creatinine Ratio 50.0     Anion Gap 31.0 mmol/L      eGFR 68.4 mL/min/1.73     Narrative:      GFR Normal >60  Chronic Kidney Disease <60  Kidney Failure <15      Magnesium [[id number]]  (Normal) Collected: 05/24/23 1024    Specimen: Blood Updated: 05/24/23 1124     Magnesium 2.3 mg/dL     Urinalysis With Culture If Indicated - Urine, Catheter [[id number]]  (Abnormal) Collected: 05/24/23 1059    Specimen: Urine, Catheter Updated: 05/24/23 1120     Color, UA Dark Yellow     Appearance, UA Clear     pH, UA 5.5     Specific Gravity, UA 1.017     Glucose, UA Negative     Ketones, UA Trace     Bilirubin, UA Small (1+)     Blood, UA Moderate (2+)     Protein, UA 30 mg/dL (1+)     Leuk Esterase, UA Negative     Nitrite, UA Negative     Urobilinogen, UA 2.0 E.U./dL    Narrative:      In absence of clinical symptoms, the presence of pyuria, bacteria, and/or nitrites on the urinalysis result does not correlate with infection.    Urinalysis, Microscopic Only - Urine, Catheter [[id number]]  (Abnormal) Collected: 05/24/23 1059    Specimen: Urine, Catheter Updated: 05/24/23 1120     RBC, UA 0-2 /HPF      WBC, UA 0-2 /HPF      Comment: Urine culture not indicated.        Bacteria, UA None Seen /HPF      Squamous Epithelial Cells, UA 0-2 /HPF      Hyaline Casts, UA 7-12 /LPF      Methodology Automated Microscopy    Lactic Acid, Plasma [[id number]]  (Abnormal) Collected: 05/24/23 1024    Specimen: Blood Updated: 05/24/23 1117     Lactate 17.0 mmol/L     Blood Gas, Arterial - [[id number]]  (Abnormal) Collected: 05/24/23 1119    Specimen: Arterial Blood Updated: 05/24/23 1116     Site Left Brachial     Lowell's Test N/A     pH, Arterial 7.152 pH units      Comment: 85 Value below critical limit         "pCO2, Arterial 28.0 mm Hg      Comment: 84 Value below reference range        pO2, Arterial 72.2 mm Hg      Comment: 84 Value below reference range        HCO3, Arterial 9.8 mmol/L      Comment: 84 Value below reference range        Base Excess, Arterial -17.5 mmol/L      Comment: 84 Value below reference range        O2 Saturation, Arterial 90.8 %      Comment: 84 Value below reference range        Temperature 37.0 C      Barometric Pressure for Blood Gas 752 mmHg      Modality NRB     FIO2 100 %      Flow Rate 15.0 lpm      Ventilator Mode NA     Notified Who DR HENRIQUEZ     Notified By [id number]     Notified Time 05/24/2023 11:20     Collected by [id number]     Comment: Meter: [id number]     :  [id number]        pCO2, Temperature Corrected 28.0 mm Hg      pH, Temp Corrected 7.152 pH Units      pO2, Temperature Corrected 72.2 mm Hg     Procalcitonin [[id number]]  (Abnormal) Collected: 05/24/23 1024    Specimen: Blood Updated: 05/24/23 1116     Procalcitonin 0.47 ng/mL     Narrative:      As a Marker for Sepsis (Non-Neonates):    1. <0.5 ng/mL represents a low risk of severe sepsis and/or septic shock.  2. >2 ng/mL represents a high risk of severe sepsis and/or septic shock.    As a Marker for Lower Respiratory Tract Infections that require antibiotic therapy:    PCT on Admission    Antibiotic Therapy       6-12 Hrs later    >0.5                Strongly Recommended  >0.25 - <0.5        Recommended   0.1 - 0.25          Discouraged              Remeasure/reassess PCT  <0.1                Strongly Discouraged     Remeasure/reassess PCT    As 28 day mortality risk marker: \"Change in Procalcitonin Result\" (>80% or <=80%) if Day 0 (or Day 1) and Day 4 values are available. Refer to http://[email or web address]-pct-calculator.com    Change in PCT <=80%  A decrease of PCT levels below or equal to 80% defines a positive change in PCT test result representing a higher risk for 28-day all-cause mortality of patients diagnosed with " severe sepsis for septic shock.    Change in PCT >80%  A decrease of PCT levels of more than 80% defines a negative change in PCT result representing a lower risk for 28-day all-cause mortality of patients diagnosed with severe sepsis or septic shock.       Blood Culture - Blood, Arm, Left [[id number]] Collected: 05/24/23 1100    Specimen: Blood from Arm, Left Updated: 05/24/23 1116    Blood Culture - Blood, Arm, Right [[id number]] Collected: 05/24/23 1100    Specimen: Blood from Arm, Right Updated: 05/24/23 1115    High Sensitivity Troponin T [[id number]]  (Abnormal) Collected: 05/24/23 1024    Specimen: Blood Updated: 05/24/23 1110     HS Troponin T 81 ng/L     Narrative:      High Sensitive Troponin T Reference Range:  <10.0 ng/L- Negative Female for AMI  <15.0 ng/L- Negative Male for AMI  >=10 - Abnormal Female indicating possible myocardial injury.  >=15 - Abnormal Male indicating possible myocardial injury.   Clinicians would have to utilize clinical acumen, EKG, Troponin, and serial changes to determine if it is an Acute Myocardial Infarction or myocardial injury due to an underlying chronic condition.         BNP [[id number]]  (Abnormal) Collected: 05/24/23 1024    Specimen: Blood Updated: 05/24/23 1110     proBNP 12,071.0 pg/mL     Narrative:      Among patients with dyspnea, NT-proBNP is highly sensitive for the detection of acute congestive heart failure. In addition NT-proBNP of <300 pg/ml effectively rules out acute congestive heart failure with 99% negative predictive value.    Results may be falsely decreased if patient taking Biotin.      D-dimer, Quantitative [[id number]]  (Abnormal) Collected: 05/24/23 1024    Specimen: Blood Updated: 05/24/23 1109     D-Dimer, Quantitative 17.76 MCGFEU/mL     Narrative:      According to the assay 's published package insert, a normal (<0.50 MCGFEU/mL) D-dimer result in conjunction with a non-high clinical probability assessment, excludes deep vein  "thrombosis (DVT) and pulmonary embolism (PE) with high sensitivity.    D-dimer values increase with age and this can make VTE exclusion of an older population difficult. To address this, the [employer], based on best available evidence and recent guidelines, recommends that clinicians use age-adjusted D-dimer thresholds in patients greater than 50 years of age with: a) a low probability of PE who do not meet all Pulmonary Embolism Rule Out Criteria, or b) in those with intermediate probability of PE.   The formula for an age-adjusted D-dimer cut-off is \"age/100\".  For example, a 60 year old patient would have an age-adjusted cut-off of 0.60 MCGFEU/mL and an 80 year old 0.80 MCGFEU/mL.    CBC & Differential [[id number]]  (Abnormal) Collected: 05/24/23 1024    Specimen: Blood Updated: 05/24/23 1055    Narrative:      The following orders were created for panel order CBC & Differential.  Procedure                               Abnormality         Status                     ---------                               -----------         ------                     CBC Auto Differential[[id number]]        Abnormal            Final result                 Please view results for these tests on the individual orders.    CBC Auto Differential [[id number]]  (Abnormal) Collected: 05/24/23 1024    Specimen: Blood Updated: 05/24/23 1055     WBC 28.60 10*3/mm3      RBC 3.99 10*6/mm3      Hemoglobin 8.7 g/dL      Hematocrit 32.7 %      MCV 82.0 fL      MCH 21.8 pg      MCHC 26.6 g/dL      RDW 21.3 %      RDW-SD 60.2 fl      MPV 12.2 fL      Platelets 167 10*3/mm3      Neutrophil % 91.2 %      Lymphocyte % 3.4 %      Monocyte % 4.3 %      Eosinophil % 0.0 %      Basophil % 0.1 %      Neutrophils, Absolute 26.06 10*3/mm3      Lymphocytes, Absolute 0.98 10*3/mm3      Monocytes, Absolute 1.23 10*3/mm3      Eosinophils, Absolute 0.00 10*3/mm3      Basophils, Absolute 0.04 10*3/mm3           Imaging Results (Last 24 Hours)  "      Procedure Component Value Units Date/Time    CT Abdomen Pelvis With Contrast [[id number]] Collected: 05/24/23 1332     Updated: 05/24/23 1356    Narrative:      EXAMINATION: CT ABDOMEN PELVIS W CONTRAST-      5/24/2023 12:52 PM CDT     HISTORY: Sepsis. Abdominal pain. Chest pain. Shortness of breath.     In order to have a CT radiation dose as low as reasonably achievable  Automated Exposure Control was utilized for adjustment of the mA and/or  KV according to patient size.     DLP in mGycm= 371     The CT scan of the abdomen and pelvis is performed after intravenous  contrast enhancement.     Images are acquired in axial plane and subsequent reconstruction in  coronal and sagittal planes.     There is no previous similar study for comparison.     There are motion artifacts which are noted on diagnostic quality of the  study.     The correlation made with chest radiograph obtained earlier today.     The chest is separately reviewed and reported in CT scan of the chest  which is to follow.     There is severe fatty infiltration of the liver. There is moderate  hepatomegaly. The spleen is normal.     The gallbladder is surgically absent.     Pancreas is normal.     There are hyper enhancing but normal shape adrenal glands bilaterally.     Moderate lobulation and renal contour bilaterally seen. No discrete  mass. There is a symmetrical nephrogram. No calculi. No hydronephrosis.  The ureters are not optimally visualized or evaluated. The urinary  bladder is significantly distended. No intrinsic abnormality.     A small uterus is seen. No adnexal masses. There is significantly  dilated and tortuous pelvic veins around the uterus and ovaries and  significantly enlarged 18 veins bilaterally.     Moderately distended thick-walled stomach is seen with significant  enhancement of the stomach wall. The stomach is full of fluid. There is  diffuse thickening and enhancement of the small bowel wall. No  significant dilatation  except for the distal ileum which is mildly  dilated with fluid. The appendix is not clearly visualized. Moderate gas  and stool is seen in the colon. Moderate thickening of the wall of the  entire colon is seen. No obstruction.     Severe atheromatous changes of the abdominal aorta iliac and femoral  arteries. Atheromatous plaques are seen at the origin of the celiac and  superior mesenteric artery. There is significant focal stenosis of the  proximal celiac trunk. Large atheromatous plaques are seen at the origin  of both renal arteries, right more than the left. Significant stenosis  of the proximal right renal artery.     There is no evidence of abdominal lymphadenopathy.     There is small amount of fluid in the abdomen and pelvis.     There is diffuse infiltration of subcutaneous fat with fluid  accumulation in the abdominal wall more pronounced inferiorly and  laterally and posteriorly. This may represent a fluid overload/anasarca.     Images reviewed in bone window show chronic degenerative changes of the  lower lumbar spine. No focal bony abnormality or a bone lesion.       Impression:      1. The hyper enhancing adrenal glands, thickened and enhanced stomach  wall, wall/mucosa of small and large bowel represent a CT hypoperfusion  complex which may be due to hypotension or a septic shock.  2. Severe hepatic steatosis.  3. Severe atheromatous changes of the abdominal aorta and iliac  arteries. Significant stenosis of the proximal celiac and right renal  artery.  4. Small amount of abdominal and pelvic fluid, abdominal wall  edema/fluid accumulation may represent fluid overload/anasarca.  5. Thickening of the small and large bowel may also be inflammatory or  ischemic process.  6. Significantly dilated and tortuous utero-ovarian veins and  significant large and distended ovale/the gallbladder planes may  represent pelvic congestion syndrome and or a component of CT  hypoperfusion complex.                              This report was finalized on 05/24/2023 13:53 by Dr. Abril Ribera MD.    CT Angiogram Chest [[id number]] Collected: 05/24/23 1338     Updated: 05/24/23 1355    Narrative:      EXAM/TECHNIQUE: CT chest angiography with 3D MIP images with IV contrast     INDICATION: Shortness of breath     COMPARISON: None     DLP: 162 mGy cm. Automated exposure control was also utilized to  decrease patient radiation dose.     FINDINGS:     No evidence of pulmonary embolus. Main pulmonary artery is nondilated.  Thoracic aorta is nonaneurysmal and contains heavy atherosclerotic  calcification and plaque. 4 chamber cardiomegaly with hepatic venous  reflux of IV contrast. The hepatic veins are dilated. No pericardial  effusion. Mild coronary artery atherosclerotic calcification     Central airways are clear. Small to moderate bilateral pleural effusions  with overlying atelectasis. Mild bilateral interlobular septal  thickening with faint groundglass opacity. Severe emphysema. No enlarged  thoracic lymph nodes. 4 mm LEFT upper lobe pulmonary nodule on image 54.     No acute chest wall soft tissue abnormality. Small perihepatic ascites.  No acute osseous finding.       Impression:         1.  No evidence of pulmonary embolus. Nonaneurysmal thoracic aorta with  heavy atherosclerotic plaque and calcification.     2.  4 chamber cardiomegaly with marked hepatic venous reflux of IV  contrast and distended hepatic veins. Findings are suggestive of  congestive heart failure.     3.  Small-to-moderate bilateral pleural effusions with overlying  atelectasis.     4.  Bilateral interlobular septal thickening with faint groundglass  opacity, favored to represent mild interstitial pulmonary edema.     5.  Severe emphysema.     6.  4 mm LEFT upper lobe pulmonary nodule. Recommend a follow-up chest  CT in one year based on Fleischner criteria.  This report was finalized on 05/24/2023 13:52 by Dr. Tony Larios MD.    CT Head Without Contrast  [[id number]] Collected: 05/24/23 1330     Updated: 05/24/23 1336    Narrative:      EXAMINATION:  CT HEAD WO CONTRAST-  5/24/2023 12:52 PM CDT     HISTORY: Mental status change, unknown cause. CTA chest and CT  abdomen/pelvis also obtained.     TECHNIQUE: Multiple axial images were obtained through the brain without  contrast infusion. Multiplanar images were reconstructed.     DLP: 1149 mGy-cm. Automated dosage reduction technique was utilized to  reduce patient dosage.     COMPARISON: No comparison study.     FINDINGS: There are no hemorrhage, edema or mass effect. There is mild  dilatation of the lateral ventricles that may be developmental or  related to mild atrophy. The paranasal sinuses are clear. The mastoid  air cells are clear. No calvarial fracture is seen. A linear low-density  shading artifact is demonstrated.          Impression:      1. No hemorrhage, edema or mass effect.  2. Mild dilatation of the lateral ventricles may be developmental or  related to mild atrophy.        The full report of this exam was immediately signed and available to the  emergency room. The patient is currently in the emergency room.     This report was finalized on 05/24/2023 13:33 by Dr. Leo Grover MD.    XR Chest 1 View [[id number]] Collected: 05/24/23 1203     Updated: 05/24/23 1207    Narrative:      EXAMINATION: XR CHEST 1 VW-     5/24/2023 11:47 AM CDT     HISTORY: Shortness of breath     1 view chest x-ray.     Cardiomegaly.  Aortic arch calcification.     Chronic interstitial lung disease.     Fully expanded and clear right lung.  Clear left upper lung.     Left lower lobe consolidation compatible with pneumonia and small amount  of pleural fluid.     Summary:  1. Left lower lobe pneumonia.  This report was finalized on 05/24/2023 12:04 by Dr. Adelfo Reddy MD.          I have personally reviewed and interpreted the radiology studies and ECG obtained at time of admission.     Assessment / Plan   Assessment:    Active Hospital Problems    Diagnosis     **Sepsis with acute respiratory failure without septic shock, due to unspecified organism, unspecified whether hypoxia or hypercapnia present     CAP (community acquired pneumonia)     Rhabdomyolysis     Nonrheumatic mitral valve insufficiency     Hypothyroidism (acquired)      Treatment Plan  The patient will be admitted to my service [facility] at [facility].   Admit to critical care  Vitals per protocol  IVF NS 75 cc/hour   Sepsis bolus given in ER and tolerated  Empiric Rocephin/Doxycycline  Follow blood cultures  NIV > Nasal canula, high flow or CPAP/BiPAP prn      Check TSH/T4  May need synthroid IV while npo    Systolic murmur > Echocardiogram 2D  Leg edema, pain and elevated D Dimer > US Doppler LE   Lovenox full dose in the interim    DVT prophylaxis > Anticoagulated, OW consider SCD    Medical Decision Making  Number and Complexity of problems: 4 complex medical problems  Differential Diagnosis: None    Conditions and Status        Condition is unchanged.     MDM Data  External documents reviewed: EHR in [employer]  Cardiac tracing (EKG, telemetry) interpretation: Sinus tachycardia  Radiology interpretation: See above  Labs reviewed: See above  Any tests that were considered but not ordered: None     Decision rules/scores evaluated (example [id number], Wells, etc): N/A     Discussed with: Patient and POA     Care Planning  Shared decision making: Patient and POA  Code status and discussions: Full code    Disposition  Social Determinants of Health that impact treatment or disposition: None  Estimated length of stay is over 2 midnights.     I confirmed that the patient's advanced care plan is present, code status is documented, and a surrogate decision maker is listed in the patient's medical record.     The patient's surrogate decision maker is Melinda Sandy.     The patient was seen and examined by me on 05/24/2023 at 2023.    Electronically signed by John  Greyson Denton MD, 05/24/23, 15:32 CDT.

## 2023-05-24 NOTE — PROGRESS NOTES
RT EQUIPMENT DEVICE RELATED - SKIN ASSESSMENT    RT Medical Equipment/Device:     NIV Mask:  Full-face    size: S    Skin Assessment:      Nose:  Intact    Device Skin Pressure Protection:  Skin-to-device areas padded:  Hydrocolloid nasal strips on bridge of nose    Nurse Notification:  Tiffanie Nogueira, CRT

## 2023-05-24 NOTE — ED PROVIDER NOTES
Subjective   History of Present Illness  This is a 68-year-old female who is brought in by EMS for altered mental status and shortness of breath.  Patient has been having some shortness of breath.  Patient states that she has also been having a cough.  Patient states that she has no chest pain.  Patient denies any fall or injuries.  Patient's family states that the last contact with her was last Thursday.  Patient does live alone.  Patient is a poor historian.  Patient denies any urinary symptoms.    Review of Systems   Respiratory:  Positive for cough and shortness of breath.    All other systems reviewed and are negative.    History reviewed. No pertinent past medical history.    Not on File    History reviewed. No pertinent surgical history.    History reviewed. No pertinent family history.    Social History     Socioeconomic History    Marital status: Single           Objective   Physical Exam  Vitals and nursing note reviewed.   Constitutional:       Appearance: She is well-developed.   HENT:      Head: Normocephalic and atraumatic.   Eyes:      Extraocular Movements: Extraocular movements intact.      Pupils: Pupils are equal, round, and reactive to light.   Cardiovascular:      Rate and Rhythm: Normal rate and regular rhythm.      Heart sounds: Normal heart sounds.   Pulmonary:      Effort: Pulmonary effort is normal.      Breath sounds: Normal breath sounds.   Abdominal:      General: Bowel sounds are normal.      Palpations: Abdomen is soft.      Tenderness: There is no abdominal tenderness.   Musculoskeletal:      Right lower leg: No tenderness. No edema.      Left lower leg: No tenderness. No edema.   Skin:     General: Skin is warm and dry.   Neurological:      General: No focal deficit present.      Mental Status: She is alert and oriented to person, place, and time.      Cranial Nerves: No cranial nerve deficit.   Psychiatric:         Mood and Affect: Mood normal.         Behavior: Behavior normal.        Procedures           ED Course  ED Course as of 05/24/23 1419   Wed May 24, 2023   1419 EKG rate 88  Normal sinus rhythm  No STEMI [RP]      ED Course User Index  [RP] Richard Galo MD                                         Lab Results (last 24 hours)       Procedure Component Value Units Date/Time    CBC & Differential [[id number]]  (Abnormal) Collected: 05/24/23 1024    Specimen: Blood Updated: 05/24/23 1055    Narrative:      The following orders were created for panel order CBC & Differential.  Procedure                               Abnormality         Status                     ---------                               -----------         ------                     CBC Auto Differential[[id number]]        Abnormal            Final result                 Please view results for these tests on the individual orders.    Comprehensive Metabolic Panel [[id number]]  (Abnormal) Collected: 05/24/23 1024    Specimen: Blood Updated: 05/24/23 1124     Glucose 130 mg/dL      BUN 46 mg/dL      Creatinine 0.92 mg/dL      Sodium 131 mmol/L      Potassium 5.1 mmol/L      Comment: Slight hemolysis detected by analyzer. Results may be affected.        Chloride 91 mmol/L      CO2 9.0 mmol/L      Calcium 8.8 mg/dL      Total Protein 6.8 g/dL      Albumin 3.7 g/dL      ALT (SGPT) 510 U/L      AST (SGOT) 1,197 U/L      Alkaline Phosphatase 239 U/L      Total Bilirubin 3.7 mg/dL      Globulin 3.1 gm/dL      A/G Ratio 1.2 g/dL      BUN/Creatinine Ratio 50.0     Anion Gap 31.0 mmol/L      eGFR 68.4 mL/min/1.73     Narrative:      GFR Normal >60  Chronic Kidney Disease <60  Kidney Failure <15      BNP [[id number]]  (Abnormal) Collected: 05/24/23 1024    Specimen: Blood Updated: 05/24/23 1110     proBNP 12,071.0 pg/mL     Narrative:      Among patients with dyspnea, NT-proBNP is highly sensitive for the detection of acute congestive heart failure. In addition NT-proBNP of <300 pg/ml effectively rules out acute congestive heart failure  "with 99% negative predictive value.    Results may be falsely decreased if patient taking Biotin.      D-dimer, Quantitative [[id number]]  (Abnormal) Collected: 05/24/23 1024    Specimen: Blood Updated: 05/24/23 1109     D-Dimer, Quantitative 17.76 MCGFEU/mL     Narrative:      According to the assay 's published package insert, a normal (<0.50 MCGFEU/mL) D-dimer result in conjunction with a non-high clinical probability assessment, excludes deep vein thrombosis (DVT) and pulmonary embolism (PE) with high sensitivity.    D-dimer values increase with age and this can make VTE exclusion of an older population difficult. To address this, the [employer], based on best available evidence and recent guidelines, recommends that clinicians use age-adjusted D-dimer thresholds in patients greater than 50 years of age with: a) a low probability of PE who do not meet all Pulmonary Embolism Rule Out Criteria, or b) in those with intermediate probability of PE.   The formula for an age-adjusted D-dimer cut-off is \"age/100\".  For example, a 60 year old patient would have an age-adjusted cut-off of 0.60 MCGFEU/mL and an 80 year old 0.80 MCGFEU/mL.    High Sensitivity Troponin T [[id number]]  (Abnormal) Collected: 05/24/23 1024    Specimen: Blood Updated: 05/24/23 1110     HS Troponin T 81 ng/L     Narrative:      High Sensitive Troponin T Reference Range:  <10.0 ng/L- Negative Female for AMI  <15.0 ng/L- Negative Male for AMI  >=10 - Abnormal Female indicating possible myocardial injury.  >=15 - Abnormal Male indicating possible myocardial injury.   Clinicians would have to utilize clinical acumen, EKG, Troponin, and serial changes to determine if it is an Acute Myocardial Infarction or myocardial injury due to an underlying chronic condition.         Lactic Acid, Plasma [[id number]]  (Abnormal) Collected: 05/24/23 1024    Specimen: Blood Updated: 05/24/23 1117     Lactate 17.0 mmol/L     Procalcitonin " "[[id number]]  (Abnormal) Collected: 05/24/23 1024    Specimen: Blood Updated: 05/24/23 1116     Procalcitonin 0.47 ng/mL     Narrative:      As a Marker for Sepsis (Non-Neonates):    1. <0.5 ng/mL represents a low risk of severe sepsis and/or septic shock.  2. >2 ng/mL represents a high risk of severe sepsis and/or septic shock.    As a Marker for Lower Respiratory Tract Infections that require antibiotic therapy:    PCT on Admission    Antibiotic Therapy       6-12 Hrs later    >0.5                Strongly Recommended  >0.25 - <0.5        Recommended   0.1 - 0.25          Discouraged              Remeasure/reassess PCT  <0.1                Strongly Discouraged     Remeasure/reassess PCT    As 28 day mortality risk marker: \"Change in Procalcitonin Result\" (>80% or <=80%) if Day 0 (or Day 1) and Day 4 values are available. Refer to http://[email or web address] [email or web address]p[place]cal[email or web address]    Change in PCT <=80%  A decrease of PCT levels below or equal to 80% defines a positive change in PCT test result representing a higher risk for 28-day all-cause mortality of patients diagnosed with severe sepsis for septic shock.    Change in PCT >80%  A decrease of PCT levels of more than 80% defines a negative change in PCT result representing a lower risk for 28-day all-cause mortality of patients diagnosed with severe sepsis or septic shock.       CK [[id number]]  (Abnormal) Collected: 05/24/23 1024    Specimen: Blood Updated: 05/24/23 1126     Creatine Kinase 10,760 U/L     Magnesium [[id number]]  (Normal) Collected: 05/24/23 1024    Specimen: Blood Updated: 05/24/23 1124     Magnesium 2.3 mg/dL     CBC Auto Differential [[id number]]  (Abnormal) Collected: 05/24/23 1024    Specimen: Blood Updated: 05/24/23 1055     WBC 28.60 10*3/mm3      RBC 3.99 10*6/mm3      Hemoglobin 8.7 g/dL      Hematocrit 32.7 %      MCV 82.0 fL      MCH 21.8 pg      MCHC 26.6 g/dL      RDW 21.3 %      RDW-SD 60.2 fl      MPV 12.2 fL      Platelets 167 10*3/mm3      " Neutrophil % 91.2 %      Lymphocyte % 3.4 %      Monocyte % 4.3 %      Eosinophil % 0.0 %      Basophil % 0.1 %      Neutrophils, Absolute 26.06 10*3/mm3      Lymphocytes, Absolute 0.98 10*3/mm3      Monocytes, Absolute 1.23 10*3/mm3      Eosinophils, Absolute 0.00 10*3/mm3      Basophils, Absolute 0.04 10*3/mm3     Urinalysis With Culture If Indicated - Urine, Catheter [[id number]]  (Abnormal) Collected: 05/24/23 1059    Specimen: Urine, Catheter Updated: 05/24/23 1120     Color, UA Dark Yellow     Appearance, UA Clear     pH, UA 5.5     Specific Gravity, UA 1.017     Glucose, UA Negative     Ketones, UA Trace     Bilirubin, UA Small (1+)     Blood, UA Moderate (2+)     Protein, UA 30 mg/dL (1+)     Leuk Esterase, UA Negative     Nitrite, UA Negative     Urobilinogen, UA 2.0 E.U./dL    Narrative:      In absence of clinical symptoms, the presence of pyuria, bacteria, and/or nitrites on the urinalysis result does not correlate with infection.    Urinalysis, Microscopic Only - Urine, Catheter [[id number]]  (Abnormal) Collected: 05/24/23 1059    Specimen: Urine, Catheter Updated: 05/24/23 1120     RBC, UA 0-2 /HPF      WBC, UA 0-2 /HPF      Comment: Urine culture not indicated.        Bacteria, UA None Seen /HPF      Squamous Epithelial Cells, UA 0-2 /HPF      Hyaline Casts, UA 7-12 /LPF      Methodology Automated Microscopy    Blood Culture - Blood, Arm, Left [[id number]] Collected: 05/24/23 1100    Specimen: Blood from Arm, Left Updated: 05/24/23 1116    Blood Culture - Blood, Arm, Right [[id number]] Collected: 05/24/23 1100    Specimen: Blood from Arm, Right Updated: 05/24/23 1115    Blood Gas, Arterial - [[id number]]  (Abnormal) Collected: 05/24/23 1119    Specimen: Arterial Blood Updated: 05/24/23 1116     Site Left Brachial     Lowell's Test N/A     pH, Arterial 7.152 pH units      Comment: 85 Value below critical limit        pCO2, Arterial 28.0 mm Hg      Comment: 84 Value below reference range        pO2, Arterial  72.2 mm Hg      Comment: 84 Value below reference range        HCO3, Arterial 9.8 mmol/L      Comment: 84 Value below reference range        Base Excess, Arterial -17.5 mmol/L      Comment: 84 Value below reference range        O2 Saturation, Arterial 90.8 %      Comment: 84 Value below reference range        Temperature 37.0 C      Barometric Pressure for Blood Gas 752 mmHg      Modality NRB     FIO2 100 %      Flow Rate 15.0 lpm      Ventilator Mode NA     Notified Who DR HENRIQUEZ     Notified By [id number]     Notified Time 05/24/2023 11:20     Collected by [id number]     Comment: Meter: [id number]     :  [id number]        pCO2, Temperature Corrected 28.0 mm Hg      pH, Temp Corrected 7.152 pH Units      pO2, Temperature Corrected 72.2 mm Hg     High Sensitivity Troponin T 2Hr [[id number]]  (Abnormal) Collected: 05/24/23 1245    Specimen: Blood Updated: 05/24/23 1319     HS Troponin T 84 ng/L      Troponin T Delta 3 ng/L     Narrative:      High Sensitive Troponin T Reference Range:  <10.0 ng/L- Negative Female for AMI  <15.0 ng/L- Negative Male for AMI  >=10 - Abnormal Female indicating possible myocardial injury.  >=15 - Abnormal Male indicating possible myocardial injury.   Clinicians would have to utilize clinical acumen, EKG, Troponin, and serial changes to determine if it is an Acute Myocardial Infarction or myocardial injury due to an underlying chronic condition.         STAT Lactic Acid, Reflex [[id number]]  (Abnormal) Collected: 05/24/23 1324    Specimen: Blood Updated: 05/24/23 1353     Lactate 12.1 mmol/L             CT Head Without Contrast   Final Result   1. No hemorrhage, edema or mass effect.   2. Mild dilatation of the lateral ventricles may be developmental or   related to mild atrophy.           The full report of this exam was immediately signed and available to the   emergency room. The patient is currently in the emergency room.       This report was finalized on 05/24/2023 13:33 by   Leo Grover MD.      CT Angiogram Chest   Final Result       1.  No evidence of pulmonary embolus. Nonaneurysmal thoracic aorta with   heavy atherosclerotic plaque and calcification.       2.  4 chamber cardiomegaly with marked hepatic venous reflux of IV   contrast and distended hepatic veins. Findings are suggestive of   congestive heart failure.       3.  Small-to-moderate bilateral pleural effusions with overlying   atelectasis.       4.  Bilateral interlobular septal thickening with faint groundglass   opacity, favored to represent mild interstitial pulmonary edema.       5.  Severe emphysema.       6.  4 mm LEFT upper lobe pulmonary nodule. Recommend a follow-up chest   CT in one year based on Fleischner criteria.   This report was finalized on 05/24/2023 13:52 by Dr. Tony Larios MD.      CT Abdomen Pelvis With Contrast   Final Result   1. The hyper enhancing adrenal glands, thickened and enhanced stomach   wall, wall/mucosa of small and large bowel represent a CT hypoperfusion   complex which may be due to hypotension or a septic shock.   2. Severe hepatic steatosis.   3. Severe atheromatous changes of the abdominal aorta and iliac   arteries. Significant stenosis of the proximal celiac and right renal   artery.   4. Small amount of abdominal and pelvic fluid, abdominal wall   edema/fluid accumulation may represent fluid overload/anasarca.   5. Thickening of the small and large bowel may also be inflammatory or   ischemic process.   6. Significantly dilated and tortuous utero-ovarian veins and   significant large and distended ovale/the gallbladder planes may   represent pelvic congestion syndrome and or a component of CT   hypoperfusion complex.                                       This report was finalized on 05/24/2023 13:53 by Dr. Abril Ribera MD.      XR Chest 1 View   Final Result          Medications   sodium chloride 0.9 % flush 10 mL (has no administration in time range)   sodium chloride  0.9 % bolus 1,000 mL (0 mL Intravenous Stopped 5/24/23 1121)   piperacillin-tazobactam (ZOSYN) IVPB 4.5 g in 100 mL NS (CD) (0 g Intravenous Stopped 5/24/23 1330)   iopamidol (ISOVUE-370) 76 % injection 100 mL (100 mL Intravenous Given 5/24/23 1320)       Medical Decision Making  This was a 68-year-old female who is brought in by EMS for altered mental status.  Patient was found to be septic.  Patient was given IV Zosyn here in the emergency room.  Patient was not given IV fluids as she appeared to be fluid overloaded.  I spoke with the hospitalist on-call and they have accepted the patient under their services.    Problems Addressed:  Altered mental status, unspecified altered mental status type: complicated acute illness or injury  Elevated troponin: complicated acute illness or injury  Pneumonia due to infectious organism, unspecified laterality, unspecified part of lung: complicated acute illness or injury  Sepsis with acute respiratory failure without septic shock, due to unspecified organism, unspecified whether hypoxia or hypercapnia present: complicated acute illness or injury    Amount and/or Complexity of Data Reviewed  Labs: ordered. Decision-making details documented in ED Course.  Radiology: ordered. Decision-making details documented in ED Course.  ECG/medicine tests: ordered. Decision-making details documented in ED Course.    Risk  Prescription drug management.  Decision regarding hospitalization.        Final diagnoses:   Sepsis with acute respiratory failure without septic shock, due to unspecified organism, unspecified whether hypoxia or hypercapnia present   Pneumonia due to infectious organism, unspecified laterality, unspecified part of lung   Elevated troponin   Altered mental status, unspecified altered mental status type       ED Disposition  ED Disposition       ED Disposition   Decision to Admit    Condition   --    Comment   Level of Care: Critical Care [6]   Diagnosis: Sepsis with acute  respiratory failure without septic shock, due to unspecified organism, unspecified whether hypoxia or hypercapnia present [[id number]]   Admitting Physician: NICK CONNER [[id number]]   Attending Physician: NICK CONNER [[id number]]   Certification: I Certify That Inpatient Hospital Services Are Medically Necessary For Greater Than 2 Midnights                 No follow-up provider specified.       Medication List      No changes were made to your prescriptions during this visit.            Richard Galo MD  05/24/23 [id number]

## 2023-05-25 PROBLEM — E44.0 MODERATE MALNUTRITION: Status: ACTIVE | Noted: 2023-01-01

## 2023-05-25 NOTE — THERAPY EVALUATION
Acute Care - Speech Language Pathology   Swallow Initial Evaluation [facility]     Patient Name: Nati Nguyen  : 1955  MRN: [id number]  Today's Date: 2023               Admit Date: 2023  SPEECH-LANGUAGE PATHOLOGY EVALUATION - SWALLOW  Subjective: The patient was seen on this date for a Clinical Swallow evaluation.  Patient was alert and cooperative.  Significant history: Presented due to altered mental status amd shortness of breath. Failed general RN dysphagia screening.   Objective: Textures given included ice, thin liquid, nectar thick liquid, honey thick liquid, and puree consistency. Solids not attempted due to generalized weakness, edentulous status, and RN report of intermittent lethargy with difficulty maintaining alertness with good endurance.   Assessment: Difficulties were noted with none of the above consistencies.  Observations: No overt s/s of aspiration observed.   SLP Findings:  Patient presents with mild to moderate oral dysphagia, without esophageal component.   Recommendations: Diet Textures: thin liquid, puree consistency food.  Medications should be taken whole or crushed with puree.   Recommended Strategies: Upright for PO, small bites and sips, alternate liquids and solids, and supervision with all PO. Oral care before breakfast, after all meals and PRN.  Other Recommended Evaluations: None    Dysphagia therapy is recommended. Rationale: Diet toleration and progression to least restrictive diet when medically appropriate.    Sosa Goodrich MS CCC-SLP 2023 12:25 CDT    Visit Dx:     ICD-10-CM ICD-9-CM   1. Sepsis with acute respiratory failure without septic shock, due to unspecified organism, unspecified whether hypoxia or hypercapnia present  A41.9 038.9    R65.20 995.92    J96.00 518.81   2. Pneumonia due to infectious organism, unspecified laterality, unspecified part of lung  J18.9 486   3. Elevated troponin  R77.8 790.6   4. Altered mental status, unspecified  altered mental status type  R41.82 780.97   5. Oral phase dysphagia  R13.11 787.21     Patient Active Problem List   Diagnosis    Sepsis with acute respiratory failure without septic shock, due to unspecified organism, unspecified whether hypoxia or hypercapnia present    CAP (community acquired pneumonia)    Rhabdomyolysis    Nonrheumatic mitral valve insufficiency    Hypothyroidism (acquired)     History reviewed. No pertinent past medical history.  History reviewed. No pertinent surgical history.    SLP Recommendation and Plan  SLP Swallowing Diagnosis: mild, oral dysphagia, R/O pharyngeal dysphagia (05/25/23 1029)  SLP Diet Recommendation: puree, thin liquids (05/25/23 1029)  Recommended Precautions and Strategies: upright posture during/after eating, small bites of food and sips of liquid, alternate between small bites of food and sips of liquid, general aspiration precautions, reflux precautions, fatigue precautions, 1:1 supervision, assist with feeding (05/25/23 1029)  SLP Rec. for Method of Medication Administration: meds whole, meds crushed, with puree, as tolerated (05/25/23 1029)     Monitor for Signs of Aspiration: yes, notify SLP if any concerns (05/25/23 1029)     Swallow Criteria for Skilled Therapeutic Interventions Met: demonstrates skilled criteria (05/25/23 1029)  Anticipated Discharge Disposition (SLP): unknown (05/25/23 1029)  Rehab Potential/Prognosis, Swallowing: good, to achieve stated therapy goals (05/25/23 1029)  Therapy Frequency (Swallow): PRN (05/25/23 1029)  Predicted Duration Therapy Intervention (Days): until discharge (05/25/23 1029)                                        Plan of Care Reviewed With: patient, caregiver  Progress: no change      SWALLOW EVALUATION (last 72 hours)       SLP Adult Swallow Evaluation       Row Name 05/25/23 1029 05/24/23 1500                Rehab Evaluation    Document Type evaluation  -MG --       Subjective Information no complaints  -MG --        Patient Observations alert;cooperative;agree to therapy  -MG --       Patient/Family/Caregiver Comments/Observations No family present.  -MG --       Session Not Performed -- unable to evaluate, medical status change  -KW       Patient Effort good  -MG --       Comment -- not appropriate per RN.  Unable to get off Bipap.  -KW       Symptoms Noted During/After Treatment none  -MG --          General Information    Patient Profile Reviewed yes  -MG --       Pertinent History Of Current Problem Presented due to altered mental status amd shortness of breath. Failed general RN dysphagia screening.  -MG --       Current Method of Nutrition NPO  -MG --       Precautions/Limitations, Vision WFL;for purposes of eval  -MG --       Precautions/Limitations, Hearing WFL;for purposes of eval  -MG --       Prior Level of Function-Communication unknown  -MG --       Prior Level of Function-Swallowing unknown  -MG --       Plans/Goals Discussed with patient;other (see comments);agreed upon  RN Angela  -MG --       Barriers to Rehab medically complex  -MG --       Patient's Goals for Discharge return to PO diet  reports she is thirsty and hungry  -MG --          Pain    Additional Documentation Pain Scale: FACES Pre/Post-Treatment (Group)  -MG --          Pain Scale: FACES Pre/Post-Treatment    Pain: FACES Scale, Pretreatment 0-->no hurt  -MG --       Posttreatment Pain Rating 0-->no hurt  -MG --          Oral Motor Structure and Function    Dentition Assessment edentulous, dentures not available  -MG --       Secretion Management dried secretions in oral cavity  -MG --       Mucosal Quality dry;sticky  -MG --       Volitional Swallow delayed  -MG --       Volitional Cough weak  -MG --          Oral Musculature and Cranial Nerve Assessment    Oral Motor General Assessment generalized oral motor weakness  -MG --          General Eating/Swallowing Observations    Respiratory Support Currently in Use high-flow nasal cannula  -MG --        O2 Liters 6L  -MG --       Eating/Swallowing Skills fed by SLP  -MG --       Positioning During Eating upright in bed  -MG --       Utensils Used spoon;straw  -MG --       Consistencies Trialed ice chips;pureed;honey-thick liquids;nectar/syrup-thick liquids;thin liquids  -MG --          Clinical Swallow Eval    Oral Prep Phase WFL  -MG --       Oral Transit WFL  -MG --       Oral Residue WFL  -MG --       Pharyngeal Phase no overt signs/symptoms of pharyngeal impairment  -MG --       Esophageal Phase unremarkable  -MG --       Clinical Swallow Evaluation Summary See note  -MG --          SLP Evaluation Clinical Impression    SLP Swallowing Diagnosis mild;oral dysphagia;R/O pharyngeal dysphagia  -MG --       Functional Impact risk of aspiration/pneumonia;risk of malnutrition;risk of dehydration  -MG --       Rehab Potential/Prognosis, Swallowing good, to achieve stated therapy goals  -MG --       Swallow Criteria for Skilled Therapeutic Interventions Met demonstrates skilled criteria  -MG --          Recommendations    Therapy Frequency (Swallow) PRN  -MG --       Predicted Duration Therapy Intervention (Days) until discharge  -MG --       SLP Diet Recommendation puree;thin liquids  -MG --       Recommended Precautions and Strategies upright posture during/after eating;small bites of food and sips of liquid;alternate between small bites of food and sips of liquid;general aspiration precautions;reflux precautions;fatigue precautions;1:1 supervision;assist with feeding  -MG --       Oral Care Recommendations Oral Care before breakfast, after meals and PRN;Toothbrush  -MG --       SLP Rec. for Method of Medication Administration meds whole;meds crushed;with puree;as tolerated  -MG --       Monitor for Signs of Aspiration yes;notify SLP if any concerns  -MG --       Anticipated Discharge Disposition (SLP) unknown  -MG --          Swallow Goals (SLP)    Swallow LTGs Swallow Long Term Goal (free text)  -MG --       Swallow  STGs diet tolerance goal selection (SLP)  -MG --       Diet Tolerance Goal Selection (SLP) Patient will tolerate trials of  -MG --          (LTG) Swallow    (LTG) Swallow Patient will tolerate least restrictive diet without s/s of aspiration.  -MG --       [place] (Swallow Long Term Goal) independently (over 90% accuracy)  -MG --       Time Frame (Swallow Long Term Goal) by discharge  -MG --       Barriers (Swallow Long Term Goal) new  -MG --       Progress/Outcomes (Swallow Long Term Goal) new goal  -MG --          (STG) Patient will tolerate trials of    Consistencies Trialed (Tolerate trials) pureed textures;soft to chew (ground) textures;thin liquids  -MG --       Desired Outcome (Tolerate trials) without signs/symptoms of aspiration;without signs of distress;with adequate oral prep/transit/clearance  -MG --       [place] (Tolerate trials) independently (over 90% accuracy)  -MG --       Time Frame (Tolerate trials) by discharge  -MG --       Progress/Outcomes (Tolerate trials) new goal  -MG --                 User Key  (r) = Recorded By, (t) = Taken By, (c) = Cosigned By      Initials Name Effective Dates    KW Deepa Collier, MS-CCC/SLP, [facility] 02/03/23 -     MG Sosa Goodrich MS CCC-SLP 08/12/22 -                     EDUCATION  The patient has been educated in the following areas:   Dysphagia (Swallowing Impairment) Oral Care/Hydration Modified Diet Instruction.        SLP GOALS       Row Name 05/25/23 1029             (LTG) Swallow    (LTG) Swallow Patient will tolerate least restrictive diet without s/s of aspiration.  -MG      [place] (Swallow Long Term Goal) independently (over 90% accuracy)  -MG      Time Frame (Swallow Long Term Goal) by discharge  -MG      Barriers (Swallow Long Term Goal) new  -MG      Progress/Outcomes (Swallow Long Term Goal) new goal  -MG         (STG) Patient will tolerate trials of    Consistencies Trialed (Tolerate trials) pureed textures;soft to chew (ground)  textures;thin liquids  -MG      Desired Outcome (Tolerate trials) without signs/symptoms of aspiration;without signs of distress;with adequate oral prep/transit/clearance  -MG      [place] (Tolerate trials) independently (over 90% accuracy)  -MG      Time Frame (Tolerate trials) by discharge  -MG      Progress/Outcomes (Tolerate trials) new goal  -MG                User Key  (r) = Recorded By, (t) = Taken By, (c) = Cosigned By      Initials Name Provider Type    Sosa Sargent MS CCC-SLP Speech and Language Pathologist                       Time Calculation:    Time Calculation- SLP       Row Name 05/25/23 1224             Time Calculation- SLP    SLP Start Time 1029  -MG      SLP Stop Time 1123  -MG      SLP Time Calculation (min) 54 min  -MG      SLP Received On 05/25/23  -MG      SLP Goal Re-Cert Due Date 06/04/23  -MG         Untimed Charges    SLP Eval/Re-eval  ST Eval Oral Pharyng Swallow - 90960  -MG      [id number] Eval Oral Pharyng Swallow Minutes 54  -MG         Total Minutes    Untimed Charges Total Minutes 54  -MG       Total Minutes 54  -MG                User Key  (r) = Recorded By, (t) = Taken By, (c) = Cosigned By      Initials Name Provider Type    Sosa Sargent MS CCC-SLP Speech and Language Pathologist                    Therapy Charges for Today       Code Description Service Date Service Provider Modifiers Qty    [id number] HC ST EVAL ORAL PHARYNG SWALLOW 4 5/25/2023 Sosa Goodrich MS CCC-SLP GN 1                 Sosa Goodrich MS CCC-SLP  5/25/2023

## 2023-05-25 NOTE — PROCEDURES
"Insert Central Line At Bedside    Date/Time: 5/25/2023 6:38 PM  Performed by: John Denton MD  Authorized by: John Denton MD   Consent: The procedure was performed in an emergent situation.  Required items: required blood products, implants, devices, and special equipment available  Patient identity confirmed: verbally with patient and provided demographic data  Time out: Immediately prior to procedure a \"time out\" was called to verify the correct patient, procedure, equipment, support staff and site/side marked as required.  Indications: vascular access    Sedation:  Patient sedated: no    Preparation: skin prepped with Betadine  Skin prep agent dried: skin prep agent completely dried prior to procedure  Sterile barriers: all five maximum sterile barriers used - cap, mask, sterile gown, sterile gloves, and large sterile sheet  Hand hygiene: hand hygiene performed prior to central venous catheter insertion  Location details: left femoral  Site selection rationale: positioning, emergent situaion, recent anticoagulation status  Patient position: flat  Catheter type: triple lumen  Catheter size: 7 Fr  Pre-procedure: landmarks identified  Ultrasound guidance: no  Number of attempts: 1  Successful placement: yes  Post-procedure: line sutured and dressing applied  Assessment: blood return through all ports and free fluid flow  Patient tolerance: patient tolerated the procedure well with no immediate complications      "

## 2023-05-25 NOTE — CODE DOCUMENTATION
Successful intubation by Dr Denton.  7.5 ETT, 21 at lip.  Color change noted. Bilateral breath sounds.  Xray ordered.

## 2023-05-25 NOTE — PROGRESS NOTES
[facility] Medicine Services  INPATIENT PROGRESS NOTE    Patient Name: Nati Nguyen  Date of Admission: 5/24/2023  Today's Date: 05/25/23  Length of Stay: 1  Primary Care Physician: Josué Galicia MD    Subjective   Chief Complaint: Hypoxemia  HPI   Opens eyes and tracks, minimally interactive today. No fever overnight. Working with SLP.     Review of Systems   All pertinent negatives and positives are as above. All other systems have been reviewed and are negative unless otherwise stated.     Objective    Temp:  [98 °F (36.7 °C)-98.6 °F (37 °C)] 98 °F (36.7 °C)  Heart Rate:  [87-98] 97  Resp:  [11-23] 11  BP: ()/(36-75) 112/51  Physical Exam  Vitals reviewed.   Constitutional:       General: She is not in acute distress.     Appearance: She is well-developed. She is ill-appearing and toxic-appearing.   HENT:      Head: Normocephalic and atraumatic.      Right Ear: External ear normal.      Left Ear: External ear normal.      Mouth/Throat:      Mouth: Mucous membranes are dry.      Pharynx: Oropharynx is clear.   Eyes:      General:         Right eye: No discharge.         Left eye: No discharge.      Extraocular Movements: Extraocular movements intact.      Conjunctiva/sclera: Conjunctivae normal.      Pupils: Pupils are equal, round, and reactive to light.   Neck:      Vascular: No JVD.   Cardiovascular:      Rate and Rhythm: Normal rate and regular rhythm.      Pulses: Normal pulses.      Heart sounds: Normal heart sounds. No murmur heard.    No friction rub. No gallop.   Pulmonary:      Effort: Pulmonary effort is normal. No respiratory distress.      Breath sounds: No stridor. Rales present. No wheezing or rhonchi.   Chest:      Chest wall: No tenderness.   Abdominal:      General: Bowel sounds are normal. There is no distension.      Palpations: Abdomen is soft.      Tenderness: There is no abdominal tenderness. There is no guarding or rebound.      Hernia: No hernia  is present.   Musculoskeletal:         General: No swelling, tenderness or deformity. Normal range of motion.      Cervical back: Normal range of motion and neck supple. No rigidity or tenderness. No muscular tenderness.      Right lower leg: No edema.      Left lower leg: No edema.   Skin:     General: Skin is dry.      Findings: No erythema or rash.   Neurological:      General: No focal deficit present.      Mental Status: She is alert. She is disoriented.      Cranial Nerves: No cranial nerve deficit.      Sensory: No sensory deficit.      Motor: Weakness present. No abnormal muscle tone.      Deep Tendon Reflexes: Reflexes normal.     Results Review:  I have reviewed the labs, radiology results, and diagnostic studies.    Laboratory Data:   Results from last 7 days   Lab Units 05/25/23  0633 05/24/23  1024   WBC 10*3/mm3 31.48* 28.60*   HEMOGLOBIN g/dL 9.5* 8.7*   HEMATOCRIT % 34.0 32.7*   PLATELETS 10*3/mm3 136* 167        Results from last 7 days   Lab Units 05/25/23  0832 05/24/23  1024   SODIUM mmol/L 133* 131*   POTASSIUM mmol/L 4.8 5.1   CHLORIDE mmol/L 100 91*   CO2 mmol/L 18.0* 9.0*   BUN mg/dL 52* 46*   CREATININE mg/dL 0.77 0.92   CALCIUM mg/dL 7.7* 8.8   BILIRUBIN mg/dL  --  3.7*   ALK PHOS U/L  --  239*   ALT (SGPT) U/L  --  510*   AST (SGOT) U/L  --  1,197*   GLUCOSE mg/dL 116* 130*       Culture Data:   No results found for: BLOODCX, URINECX, WOUNDCX, MRSACX, RESPCX, STOOLCX    Radiology Data:   Imaging Results (Last 24 Hours)       Procedure Component Value Units Date/Time    US Venous Doppler Lower Extremity Bilateral (duplex) [[id number]] Resulted: 05/24/23 1610     Updated: 05/24/23 1639    CT Abdomen Pelvis With Contrast [[id number]] Collected: 05/24/23 1332     Updated: 05/24/23 1356    Narrative:      EXAMINATION: CT ABDOMEN PELVIS W CONTRAST-      5/24/2023 12:52 PM CDT     HISTORY: Sepsis. Abdominal pain. Chest pain. Shortness of breath.     In order to have a CT radiation dose as low as  reasonably achievable  Automated Exposure Control was utilized for adjustment of the mA and/or  KV according to patient size.     DLP in mGycm= 371     The CT scan of the abdomen and pelvis is performed after intravenous  contrast enhancement.     Images are acquired in axial plane and subsequent reconstruction in  coronal and sagittal planes.     There is no previous similar study for comparison.     There are motion artifacts which are noted on diagnostic quality of the  study.     The correlation made with chest radiograph obtained earlier today.     The chest is separately reviewed and reported in CT scan of the chest  which is to follow.     There is severe fatty infiltration of the liver. There is moderate  hepatomegaly. The spleen is normal.     The gallbladder is surgically absent.     Pancreas is normal.     There are hyper enhancing but normal shape adrenal glands bilaterally.     Moderate lobulation and renal contour bilaterally seen. No discrete  mass. There is a symmetrical nephrogram. No calculi. No hydronephrosis.  The ureters are not optimally visualized or evaluated. The urinary  bladder is significantly distended. No intrinsic abnormality.     A small uterus is seen. No adnexal masses. There is significantly  dilated and tortuous pelvic veins around the uterus and ovaries and  significantly enlarged 18 veins bilaterally.     Moderately distended thick-walled stomach is seen with significant  enhancement of the stomach wall. The stomach is full of fluid. There is  diffuse thickening and enhancement of the small bowel wall. No  significant dilatation except for the distal ileum which is mildly  dilated with fluid. The appendix is not clearly visualized. Moderate gas  and stool is seen in the colon. Moderate thickening of the wall of the  entire colon is seen. No obstruction.     Severe atheromatous changes of the abdominal aorta iliac and femoral  arteries. Atheromatous plaques are seen at the origin  of the celiac and  superior mesenteric artery. There is significant focal stenosis of the  proximal celiac trunk. Large atheromatous plaques are seen at the origin  of both renal arteries, right more than the left. Significant stenosis  of the proximal right renal artery.     There is no evidence of abdominal lymphadenopathy.     There is small amount of fluid in the abdomen and pelvis.     There is diffuse infiltration of subcutaneous fat with fluid  accumulation in the abdominal wall more pronounced inferiorly and  laterally and posteriorly. This may represent a fluid overload/anasarca.     Images reviewed in bone window show chronic degenerative changes of the  lower lumbar spine. No focal bony abnormality or a bone lesion.       Impression:      1. The hyper enhancing adrenal glands, thickened and enhanced stomach  wall, wall/mucosa of small and large bowel represent a CT hypoperfusion  complex which may be due to hypotension or a septic shock.  2. Severe hepatic steatosis.  3. Severe atheromatous changes of the abdominal aorta and iliac  arteries. Significant stenosis of the proximal celiac and right renal  artery.  4. Small amount of abdominal and pelvic fluid, abdominal wall  edema/fluid accumulation may represent fluid overload/anasarca.  5. Thickening of the small and large bowel may also be inflammatory or  ischemic process.  6. Significantly dilated and tortuous utero-ovarian veins and  significant large and distended ovale/the gallbladder planes may  represent pelvic congestion syndrome and or a component of CT  hypoperfusion complex.                             This report was finalized on 05/24/2023 13:53 by Dr. Abril Ribera MD.    CT Angiogram Chest [[id number]] Collected: 05/24/23 1338     Updated: 05/24/23 1355    Narrative:      EXAM/TECHNIQUE: CT chest angiography with 3D MIP images with IV contrast     INDICATION: Shortness of breath     COMPARISON: None     DLP: 162 mGy cm. Automated exposure  control was also utilized to  decrease patient radiation dose.     FINDINGS:     No evidence of pulmonary embolus. Main pulmonary artery is nondilated.  Thoracic aorta is nonaneurysmal and contains heavy atherosclerotic  calcification and plaque. 4 chamber cardiomegaly with hepatic venous  reflux of IV contrast. The hepatic veins are dilated. No pericardial  effusion. Mild coronary artery atherosclerotic calcification     Central airways are clear. Small to moderate bilateral pleural effusions  with overlying atelectasis. Mild bilateral interlobular septal  thickening with faint groundglass opacity. Severe emphysema. No enlarged  thoracic lymph nodes. 4 mm LEFT upper lobe pulmonary nodule on image 54.     No acute chest wall soft tissue abnormality. Small perihepatic ascites.  No acute osseous finding.       Impression:         1.  No evidence of pulmonary embolus. Nonaneurysmal thoracic aorta with  heavy atherosclerotic plaque and calcification.     2.  4 chamber cardiomegaly with marked hepatic venous reflux of IV  contrast and distended hepatic veins. Findings are suggestive of  congestive heart failure.     3.  Small-to-moderate bilateral pleural effusions with overlying  atelectasis.     4.  Bilateral interlobular septal thickening with faint groundglass  opacity, favored to represent mild interstitial pulmonary edema.     5.  Severe emphysema.     6.  4 mm LEFT upper lobe pulmonary nodule. Recommend a follow-up chest  CT in one year based on Fleischner criteria.  This report was finalized on 05/24/2023 13:52 by Dr. Tony Larios MD.    CT Head Without Contrast [[id number]] Collected: 05/24/23 1330     Updated: 05/24/23 1336    Narrative:      EXAMINATION:  CT HEAD WO CONTRAST-  5/24/2023 12:52 PM CDT     HISTORY: Mental status change, unknown cause. CTA chest and CT  abdomen/pelvis also obtained.     TECHNIQUE: Multiple axial images were obtained through the brain without  contrast infusion. Multiplanar images  were reconstructed.     DLP: 1149 mGy-cm. Automated dosage reduction technique was utilized to  reduce patient dosage.     COMPARISON: No comparison study.     FINDINGS: There are no hemorrhage, edema or mass effect. There is mild  dilatation of the lateral ventricles that may be developmental or  related to mild atrophy. The paranasal sinuses are clear. The mastoid  air cells are clear. No calvarial fracture is seen. A linear low-density  shading artifact is demonstrated.          Impression:      1. No hemorrhage, edema or mass effect.  2. Mild dilatation of the lateral ventricles may be developmental or  related to mild atrophy.        The full report of this exam was immediately signed and available to the  emergency room. The patient is currently in the emergency room.     This report was finalized on 05/24/2023 13:33 by Dr. Leo Grover MD.    XR Chest 1 View [[id number]] Collected: 05/24/23 1203     Updated: 05/24/23 1207    Narrative:      EXAMINATION: XR CHEST 1 VW-     5/24/2023 11:47 AM CDT     HISTORY: Shortness of breath     1 view chest x-ray.     Cardiomegaly.  Aortic arch calcification.     Chronic interstitial lung disease.     Fully expanded and clear right lung.  Clear left upper lung.     Left lower lobe consolidation compatible with pneumonia and small amount  of pleural fluid.     Summary:  1. Left lower lobe pneumonia.  This report was finalized on 05/24/2023 12:04 by Dr. Adelfo Reddy MD.            I have reviewed the patient's current medications.     Assessment/Plan   Assessment  Active Hospital Problems    Diagnosis     **Sepsis with acute respiratory failure without septic shock, due to unspecified organism, unspecified whether hypoxia or hypercapnia present     CAP (community acquired pneumonia)     Rhabdomyolysis     Nonrheumatic mitral valve insufficiency     Hypothyroidism (acquired)      Treatment Plan  Vitals per protocol  Sepsis appear to be improving  PNA/ARF >  Rocephin/doxycycline  Follow cultures  Oxygen prn, goal room air    Npo > advance diet if OK with SLP    D-Dimer and leg edema > Pending US venous doppler report  May need to consider CTA legs  Echocardiogram pending    She could continue care in medical floor    DVT prophylaxis > Currently on Lovenox 1 mg/kg    Medical Decision Making  Number and Complexity of problems: 3 complex medical problems  Differential Diagnosis: None    Conditions and Status        Condition is improving.     [facility] Data  External documents reviewed: EHR  Cardiac tracing (EKG, telemetry) interpretation: sinus tachycardia  Radiology interpretation: See above  Labs reviewed: See above  Any tests that were considered but not ordered: None     Decision rules/scores evaluated (example [id number], Wells, etc): N/A     Discussed with: Patient     Care Planning  Shared decision making: Patient and POA  Code status and discussions: Full code    Disposition  Social Determinants of Health that impact treatment or disposition: None  I expect the patient to be discharged to home versus SNF in 2-4 days.     Electronically signed by John Denton MD, 05/25/23, 10:39 CDT.    Review of records:    Coronary artery disease (414.00) (I25.10)  ???NSTEMI in 9/14 with PTCA and stenting of the distal RCA using LACIE.PTCA and stenting  of the proximal RCA in 4/15, using LACIE. Preserved LVF.

## 2023-05-25 NOTE — PAYOR COMM NOTE
"[facility] [phone]  [phone]    ER ADMIT TO INPATIENT ON 5/24/23 TO CCU.    [facility]AL                Driss Nguyen (68 y.o. Female)       Date of Birth   1955    Social Security Number       Address   [address] [place] [address]    Home Phone   [phone]    MRN   [id number]       Scientology   Other    Marital Status   Single                            Admission Date   5/24/23    Admission Type   Emergency    Admitting Provider   John Denton MD    Attending Provider   John Denton MD    Department, Room/Bed   [facility] CARDIAC CARE, C003/1       Discharge Date       Discharge Disposition       Discharge Destination                                 Attending Provider: John Denton MD    Allergies: Aspirin    Isolation: None   Infection: None   Code Status: CPR    Ht: 162.6 cm (64\")   Wt: 53.3 kg (117 lb 6.4 oz)    Admission Cmt: None   Principal Problem: Sepsis with acute respiratory failure without septic shock, due to unspecified organism, unspecified whether hypoxia or hypercapnia present [A41.9,R65.20,J96.00]                   Active Insurance as of 5/24/2023       Primary Coverage       Payor Plan Insurance Group Employer/Plan Group    HUMANA MEDICARE REPLACEMENT HUMANA MEDICARE REPLACEMENT [id number]       Payor Plan Address Payor Plan Phone Number Payor Plan Fax Number Effective Dates    [address] [address] [phone]  1/1/2023 - None Entered    [facility] [address]         Subscriber Name Subscriber Birth Date Member ID       DRISS NGUYEN 1955 [id number]                     Emergency Contacts        (Rel.) Home Phone Work Phone Mobile Phone    marco schaffer (Legal Guardian) -- -- [phone]             [facility] Encounter Date/Time: 5/24/2023 [facility]   Hospital Account: [id number]    MRN: [id number]   Patient:  Driss Nguyen   Contact Serial #: " [id number]   SSN:          ENCOUNTER             Patient Class: Inpatient   Unit: [facility] CCU   Hospital Service: Medicine     Bed: C003/1   Admitting Provider: John Denton,*   Referring Physician:     Attending Provider: John Denton,*   Adm Diagnosis: Sepsis with acute respir*               PATIENT          Name: Driss Briggs : 1955 (68 yrs)   Address: [address] [place] [address] Sex: Female   City: [place] [address]   County: [place]   Marital Status: SINGLE Ethnicity: NOT                                                                              Race: WHITE   Primary Care Provider: Josué Galicia MD Patients Phone: Home Phone: [phone]     Mobile Phone: [phone]   EMERGENCY CONTACT   Contact Name Legal Guardian? Relationship to Patient Home Phone Work Phone   1. marco schaffer  2. *No Contact Specified*      Legal Guardian                   GUARANTOR            Guarantor: Driss Briggs     : 1955   Address: [address] [place] [address] Sex: [place] [address]     Relation to Patient: Self       Home Phone: [phone]   Guarantor ID: [id number]       Work Phone:     GUARANTOR EMPLOYER   Employer:           Status: RETIRED   COVERAGE          PRIMARY INSURANCE   Payor: HUMANA MEDICARE REPLACEMENT Plan: HUMANA MEDICARE REPLACEMENT   Group Number: [id number] Insurance Type: INDEMNITY   Subscriber Name: DRISS BRIGGS Subscriber : 1955   Subscriber ID: [id number] Coverage Address: [address] [address]   Pat. Rel. to Subscriber: Self Coverage Phone: [phone]   SECONDARY INSURANCE   Payor: N/A Plan: N/A   Group Number:   Insurance Type:     Subscriber Name:   Subscriber :     Subscriber ID:   Coverage Address:     Pat. Rel. to Subscriber:   Coverage Phone:        Contact Serial # ([id number])         May 25, 2023    Chart ID ([id number])         John Denton MD   Physician  Medicine     H&P          Date of Service:  "05/24/23 1531  Creation Time: 05/24/23 1531     Incomplete       Expand All Collapse All         [facility] Medicine Services  HISTORY AND PHYSICAL     Date of Admission: 5/24/2023  Primary Care Physician: Josué Galicia MD     Subjective   Primary Historian: POA     Chief Complaint: Lethargy     History of Present Illness  68 year old female with PMH of HTN, hypothyroidism, COPD, that presents to the ER by EMS due to hypoxemia and AMS. She has not been seen for about 7 days and POA asked for a well visit. She was found sitting in her chair staring and listless. Oxygen was low at about 60%. Arrived to the ER hypoxemic, acidotic and poorly responsive.      Review of Systems   Otherwise complete ROS reviewed and negative except as mentioned in the HPI.     Past Medical History:   Medical History[]Expand by Default   History reviewed. No pertinent past medical history.     Past Surgical History:  Surgical History   History reviewed. No pertinent surgical history.     Social History:       Family History: family history is not on file.        Allergies:  Not on File     Medications:  Prior to Admission medications    Not on File      I have utilized all available immediate resources to obtain, update, or review the patient's current medications (including all prescriptions, over-the-counter products, herbals, cannabis/cannabidiol products, and vitamin/mineral/dietary (nutritional) supplements).     Objective      Vital Signs: /53   Pulse 97   Temp 98.5 °F (36.9 °C) (Rectal)   Resp 18   Ht 162.6 cm (64\")   Wt 60 kg (132 lb 4.4 oz)   SpO2 90%   BMI 22.71 kg/m²             Results Reviewed:  Lab Results (last 24 hours)         Procedure Component Value Units Date/Time     [place] Draw [[id number]] Collected: 05/24/23 1024     Specimen: Blood Updated: 05/24/23 1430     Narrative:       The following orders were created for panel order [place] Draw.  Procedure                  "              Abnormality         Status                     ---------                               -----------         ------                     Green Top (Gel)[[id number]]                                  Final result               Lavender Top[[id number]]                                     Final result               Red Top[[id number]]                                          Final result               Shi Top[[id number]]                                         Final result               Light Blue Top[[id number]]                                   Final result                  Please view results for these tests on the individual orders.     Gray Top [[id number]] Collected: 05/24/23 1024     Specimen: Blood Updated: 05/24/23 1430       Extra Tube Hold for add-ons.       Comment: Auto resulted.        STAT Lactic Acid, Reflex [[id number]]  (Abnormal) Collected: 05/24/23 1324     Specimen: Blood Updated: 05/24/23 1353       Lactate 12.1 mmol/L       High Sensitivity Troponin T 2Hr [[id number]]  (Abnormal) Collected: 05/24/23 1245     Specimen: Blood Updated: 05/24/23 1319       HS Troponin T 84 ng/L         Troponin T Delta 3 ng/L       Narrative:       High Sensitive Troponin T Reference Range:  <10.0 ng/L- Negative Female for AMI  <15.0 ng/L- Negative Male for AMI  >=10 - Abnormal Female indicating possible myocardial injury.  >=15 - Abnormal Male indicating possible myocardial injury.   Clinicians would have to utilize clinical acumen, EKG, Troponin, and serial changes to determine if it is an Acute Myocardial Infarction or myocardial injury due to an underlying chronic condition.           Green Top (Gel) [[id number]] Collected: 05/24/23 1024     Specimen: Blood Updated: 05/24/23 1131       Extra Tube Hold for add-ons.       Comment: Auto resulted.        Lavender Top [[id number]] Collected: 05/24/23 1024     Specimen: Blood Updated: 05/24/23 1131       Extra Tube hold for add-on       Comment: Auto resulted         Red Top [[id number]] Collected: 05/24/23 1024     Specimen: Blood Updated: 05/24/23 1131       Extra Tube Hold for add-ons.       Comment: Auto resulted.        Light Blue Top [[id number]] Collected: 05/24/23 1024     Specimen: Blood Updated: 05/24/23 1131       Extra Tube Hold for add-ons.       Comment: Auto resulted        CK [[id number]]  (Abnormal) Collected: 05/24/23 1024     Specimen: Blood Updated: 05/24/23 1126       Creatine Kinase 10,760 U/L       Comprehensive Metabolic Panel [[id number]]  (Abnormal) Collected: 05/24/23 1024     Specimen: Blood Updated: 05/24/23 1124       Glucose 130 mg/dL         BUN 46 mg/dL         Creatinine 0.92 mg/dL         Sodium 131 mmol/L         Potassium 5.1 mmol/L         Comment: Slight hemolysis detected by analyzer. Results may be affected.          Chloride 91 mmol/L         CO2 9.0 mmol/L         Calcium 8.8 mg/dL         Total Protein 6.8 g/dL         Albumin 3.7 g/dL         ALT (SGPT) 510 U/L         AST (SGOT) 1,197 U/L         Alkaline Phosphatase 239 U/L         Total Bilirubin 3.7 mg/dL         Globulin 3.1 gm/dL         A/G Ratio 1.2 g/dL         BUN/Creatinine Ratio 50.0       Anion Gap 31.0 mmol/L         eGFR 68.4 mL/min/1.73       Narrative:       GFR Normal >60  Chronic Kidney Disease <60  Kidney Failure <15        Magnesium [[id number]]  (Normal) Collected: 05/24/23 1024     Specimen: Blood Updated: 05/24/23 1124       Magnesium 2.3 mg/dL       Urinalysis With Culture If Indicated - Urine, Catheter [[id number]]  (Abnormal) Collected: 05/24/23 1059     Specimen: Urine, Catheter Updated: 05/24/23 1120       Color, UA Dark Yellow       Appearance, UA Clear       pH, UA 5.5       Specific Gravity, UA 1.017       Glucose, UA Negative       Ketones, UA Trace       Bilirubin, UA Small (1+)       Blood, UA Moderate (2+)       Protein, UA 30 mg/dL (1+)       Leuk Esterase, UA Negative       Nitrite, UA Negative       Urobilinogen, UA 2.0 E.U./dL     Narrative:       In  absence of clinical symptoms, the presence of pyuria, bacteria, and/or nitrites on the urinalysis result does not correlate with infection.     Urinalysis, Microscopic Only - Urine, Catheter [[id number]]  (Abnormal) Collected: 05/24/23 1059     Specimen: Urine, Catheter Updated: 05/24/23 1120       RBC, UA 0-2 /HPF         WBC, UA 0-2 /HPF         Comment: Urine culture not indicated.          Bacteria, UA None Seen /HPF         Squamous Epithelial Cells, UA 0-2 /HPF         Hyaline Casts, UA 7-12 /LPF         Methodology Automated Microscopy     Lactic Acid, Plasma [[id number]]  (Abnormal) Collected: 05/24/23 1024     Specimen: Blood Updated: 05/24/23 1117       Lactate 17.0 mmol/L       Blood Gas, Arterial - [[id number]]  (Abnormal) Collected: 05/24/23 1119     Specimen: Arterial Blood Updated: 05/24/23 1116       Site Left Brachial       Lowell's Test N/A       pH, Arterial 7.152 pH units         Comment: 85 Value below critical limit          pCO2, Arterial 28.0 mm Hg         Comment: 84 Value below reference range          pO2, Arterial 72.2 mm Hg         Comment: 84 Value below reference range          HCO3, Arterial 9.8 mmol/L         Comment: 84 Value below reference range          Base Excess, Arterial -17.5 mmol/L         Comment: 84 Value below reference range          O2 Saturation, Arterial 90.8 %         Comment: 84 Value below reference range          Temperature 37.0 C         Barometric Pressure for Blood Gas 752 mmHg         Modality NRB       FIO2 100 %         Flow Rate 15.0 lpm         Ventilator Mode NA       Notified Who DR HENRIQUEZ       Notified By [id number]       Notified Time 05/24/2023 11:20       Collected by [id number]       Comment: Meter: [id number]     :  [id number]          pCO2, Temperature Corrected 28.0 mm Hg         pH, Temp Corrected 7.152 pH Units         pO2, Temperature Corrected 72.2 mm Hg       Procalcitonin [[id number]]  (Abnormal) Collected: 05/24/23 1024     Specimen:  "Blood Updated: 05/24/23 1116       Procalcitonin 0.47 ng/mL       Narrative:       As a Marker for Sepsis (Non-Neonates):     1. <0.5 ng/mL represents a low risk of severe sepsis and/or septic shock.  2. >2 ng/mL represents a high risk of severe sepsis and/or septic shock.     As a Marker for Lower Respiratory Tract Infections that require antibiotic therapy:     PCT on Admission    Antibiotic Therapy       6-12 Hrs later     >0.5                Strongly Recommended  >0.25 - <0.5        Recommended   0.1 - 0.25          Discouraged              Remeasure/reassess PCT  <0.1                Strongly Discouraged     Remeasure/reassess PCT     As 28 day mortality risk marker: \"Change in Procalcitonin Result\" (>80% or <=80%) if Day 0 (or Day 1) and Day 4 values are available. Refer to [email or web address]     Change in PCT <=80%  A decrease of PCT levels below or equal to 80% defines a positive change in PCT test result representing a higher risk for 28-day all-cause mortality of patients diagnosed with severe sepsis for septic shock.     Change in PCT >80%  A decrease of PCT levels of more than 80% defines a negative change in PCT result representing a lower risk for 28-day all-cause mortality of patients diagnosed with severe sepsis or septic shock.         Blood Culture - Blood, Arm, Left [[id number]] Collected: 05/24/23 1100     Specimen: Blood from Arm, Left Updated: 05/24/23 1116     Blood Culture - Blood, Arm, Right [[id number]] Collected: 05/24/23 1100     Specimen: Blood from Arm, Right Updated: 05/24/23 1115     High Sensitivity Troponin T [[id number]]  (Abnormal) Collected: 05/24/23 1024     Specimen: Blood Updated: 05/24/23 1110       HS Troponin T 81 ng/L       Narrative:       High Sensitive Troponin T Reference Range:  <10.0 ng/L- Negative Female for AMI  <15.0 ng/L- Negative Male for AMI  >=10 - Abnormal Female indicating possible myocardial injury.  >=15 - Abnormal Male indicating possible " "myocardial injury.   Clinicians would have to utilize clinical acumen, EKG, Troponin, and serial changes to determine if it is an Acute Myocardial Infarction or myocardial injury due to an underlying chronic condition.           BNP [[id number]]  (Abnormal) Collected: 05/24/23 1024     Specimen: Blood Updated: 05/24/23 1110       proBNP 12,071.0 pg/mL       Narrative:       Among patients with dyspnea, NT-proBNP is highly sensitive for the detection of acute congestive heart failure. In addition NT-proBNP of <300 pg/ml effectively rules out acute congestive heart failure with 99% negative predictive value.     Results may be falsely decreased if patient taking Biotin.        D-dimer, Quantitative [[id number]]  (Abnormal) Collected: 05/24/23 1024     Specimen: Blood Updated: 05/24/23 1109       D-Dimer, Quantitative 17.76 MCGFEU/mL       Narrative:       According to the assay 's published package insert, a normal (<0.50 MCGFEU/mL) D-dimer result in conjunction with a non-high clinical probability assessment, excludes deep vein thrombosis (DVT) and pulmonary embolism (PE) with high sensitivity.     D-dimer values increase with age and this can make VTE exclusion of an older population difficult. To address this, the American College of Physicians, based on best available evidence and recent guidelines, recommends that clinicians use age-adjusted D-dimer thresholds in patients greater than 50 years of age with: a) a low probability of PE who do not meet all Pulmonary Embolism Rule Out Criteria, or b) in those with intermediate probability of PE.   The formula for an age-adjusted D-dimer cut-off is \"age/100\".  For example, a 60 year old patient would have an age-adjusted cut-off of 0.60 MCGFEU/mL and an 80 year old 0.80 MCGFEU/mL.     CBC & Differential [[id number]]  (Abnormal) Collected: 05/24/23 1024     Specimen: Blood Updated: 05/24/23 1055     Narrative:       The following orders were created for panel " order CBC & Differential.  Procedure                               Abnormality         Status                     ---------                               -----------         ------                     CBC Auto Differential[[id number]]        Abnormal            Final result                  Please view results for these tests on the individual orders.     CBC Auto Differential [[id number]]  (Abnormal) Collected: 05/24/23 1024     Specimen: Blood Updated: 05/24/23 1055       WBC 28.60 10*3/mm3         RBC 3.99 10*6/mm3         Hemoglobin 8.7 g/dL         Hematocrit 32.7 %         MCV 82.0 fL         MCH 21.8 pg         MCHC 26.6 g/dL         RDW 21.3 %         RDW-SD 60.2 fl         MPV 12.2 fL         Platelets 167 10*3/mm3         Neutrophil % 91.2 %         Lymphocyte % 3.4 %         Monocyte % 4.3 %         Eosinophil % 0.0 %         Basophil % 0.1 %         Neutrophils, Absolute 26.06 10*3/mm3         Lymphocytes, Absolute 0.98 10*3/mm3         Monocytes, Absolute 1.23 10*3/mm3         Eosinophils, Absolute 0.00 10*3/mm3         Basophils, Absolute 0.04 10*3/mm3               Imaging Results (Last 24 Hours)         Procedure Component Value Units Date/Time     CT Abdomen Pelvis With Contrast [[id number]] Collected: 05/24/23 1332       Updated: 05/24/23 1356     Narrative:       EXAMINATION: CT ABDOMEN PELVIS W CONTRAST-      5/24/2023 12:52 PM CDT     HISTORY: Sepsis. Abdominal pain. Chest pain. Shortness of breath.     In order to have a CT radiation dose as low as reasonably achievable  Automated Exposure Control was utilized for adjustment of the mA and/or  KV according to patient size.     DLP in mGycm= 371     The CT scan of the abdomen and pelvis is performed after intravenous  contrast enhancement.     Images are acquired in axial plane and subsequent reconstruction in  coronal and sagittal planes.     There is no previous similar study for comparison.     There are motion artifacts which are noted on  diagnostic quality of the  study.     The correlation made with chest radiograph obtained earlier today.     The chest is separately reviewed and reported in CT scan of the chest  which is to follow.     There is severe fatty infiltration of the liver. There is moderate  hepatomegaly. The spleen is normal.     The gallbladder is surgically absent.     Pancreas is normal.     There are hyper enhancing but normal shape adrenal glands bilaterally.     Moderate lobulation and renal contour bilaterally seen. No discrete  mass. There is a symmetrical nephrogram. No calculi. No hydronephrosis.  The ureters are not optimally visualized or evaluated. The urinary  bladder is significantly distended. No intrinsic abnormality.     A small uterus is seen. No adnexal masses. There is significantly  dilated and tortuous pelvic veins around the uterus and ovaries and  significantly enlarged 18 veins bilaterally.     Moderately distended thick-walled stomach is seen with significant  enhancement of the stomach wall. The stomach is full of fluid. There is  diffuse thickening and enhancement of the small bowel wall. No  significant dilatation except for the distal ileum which is mildly  dilated with fluid. The appendix is not clearly visualized. Moderate gas  and stool is seen in the colon. Moderate thickening of the wall of the  entire colon is seen. No obstruction.     Severe atheromatous changes of the abdominal aorta iliac and femoral  arteries. Atheromatous plaques are seen at the origin of the celiac and  superior mesenteric artery. There is significant focal stenosis of the  proximal celiac trunk. Large atheromatous plaques are seen at the origin  of both renal arteries, right more than the left. Significant stenosis  of the proximal right renal artery.     There is no evidence of abdominal lymphadenopathy.     There is small amount of fluid in the abdomen and pelvis.     There is diffuse infiltration of subcutaneous fat with  fluid  accumulation in the abdominal wall more pronounced inferiorly and  laterally and posteriorly. This may represent a fluid overload/anasarca.     Images reviewed in bone window show chronic degenerative changes of the  lower lumbar spine. No focal bony abnormality or a bone lesion.        Impression:       1. The hyper enhancing adrenal glands, thickened and enhanced stomach  wall, wall/mucosa of small and large bowel represent a CT hypoperfusion  complex which may be due to hypotension or a septic shock.  2. Severe hepatic steatosis.  3. Severe atheromatous changes of the abdominal aorta and iliac  arteries. Significant stenosis of the proximal celiac and right renal  artery.  4. Small amount of abdominal and pelvic fluid, abdominal wall  edema/fluid accumulation may represent fluid overload/anasarca.  5. Thickening of the small and large bowel may also be inflammatory or  ischemic process.  6. Significantly dilated and tortuous utero-ovarian veins and  significant large and distended ovale/the gallbladder planes may  represent pelvic congestion syndrome and or a component of CT  hypoperfusion complex.                             This report was finalized on 05/24/2023 13:53 by Dr. Abril Ribera MD.     CT Angiogram Chest [[id number]] Collected: 05/24/23 1338       Updated: 05/24/23 1355     Narrative:       EXAM/TECHNIQUE: CT chest angiography with 3D MIP images with IV contrast     INDICATION: Shortness of breath     COMPARISON: None     DLP: 162 mGy cm. Automated exposure control was also utilized to  decrease patient radiation dose.     FINDINGS:     No evidence of pulmonary embolus. Main pulmonary artery is nondilated.  Thoracic aorta is nonaneurysmal and contains heavy atherosclerotic  calcification and plaque. 4 chamber cardiomegaly with hepatic venous  reflux of IV contrast. The hepatic veins are dilated. No pericardial  effusion. Mild coronary artery atherosclerotic calcification     Central airways  are clear. Small to moderate bilateral pleural effusions  with overlying atelectasis. Mild bilateral interlobular septal  thickening with faint groundglass opacity. Severe emphysema. No enlarged  thoracic lymph nodes. 4 mm LEFT upper lobe pulmonary nodule on image 54.     No acute chest wall soft tissue abnormality. Small perihepatic ascites.  No acute osseous finding.        Impression:          1.  No evidence of pulmonary embolus. Nonaneurysmal thoracic aorta with  heavy atherosclerotic plaque and calcification.     2.  4 chamber cardiomegaly with marked hepatic venous reflux of IV  contrast and distended hepatic veins. Findings are suggestive of  congestive heart failure.     3.  Small-to-moderate bilateral pleural effusions with overlying  atelectasis.     4.  Bilateral interlobular septal thickening with faint groundglass  opacity, favored to represent mild interstitial pulmonary edema.     5.  Severe emphysema.     6.  4 mm LEFT upper lobe pulmonary nodule. Recommend a follow-up chest  CT in one year based on Fleischner criteria.  This report was finalized on 05/24/2023 13:52 by Dr. Tony Larios MD.     CT Head Without Contrast [[id number]] Collected: 05/24/23 1330       Updated: 05/24/23 1336     Narrative:       EXAMINATION:  CT HEAD WO CONTRAST-  5/24/2023 12:52 PM CDT     HISTORY: Mental status change, unknown cause. CTA chest and CT  abdomen/pelvis also obtained.     TECHNIQUE: Multiple axial images were obtained through the brain without  contrast infusion. Multiplanar images were reconstructed.     DLP: 1149 mGy-cm. Automated dosage reduction technique was utilized to  reduce patient dosage.     COMPARISON: No comparison study.     FINDINGS: There are no hemorrhage, edema or mass effect. There is mild  dilatation of the lateral ventricles that may be developmental or  related to mild atrophy. The paranasal sinuses are clear. The mastoid  air cells are clear. No calvarial fracture is seen. A linear  low-density  shading artifact is demonstrated.           Impression:       1. No hemorrhage, edema or mass effect.  2. Mild dilatation of the lateral ventricles may be developmental or  related to mild atrophy.        The full report of this exam was immediately signed and available to the  emergency room. The patient is currently in the emergency room.     This report was finalized on 05/24/2023 13:33 by Dr. Leo Grover MD.     XR Chest 1 View [[id number]] Collected: 05/24/23 1203       Updated: 05/24/23 1207     Narrative:       EXAMINATION: XR CHEST 1 VW-     5/24/2023 11:47 AM CDT     HISTORY: Shortness of breath     1 view chest x-ray.     Cardiomegaly.  Aortic arch calcification.     Chronic interstitial lung disease.     Fully expanded and clear right lung.  Clear left upper lung.     Left lower lobe consolidation compatible with pneumonia and small amount  of pleural fluid.     Summary:  1. Left lower lobe pneumonia.  This report was finalized on 05/24/2023 12:04 by Dr. Adelfo Reddy MD.             I have personally reviewed and interpreted the radiology studies and ECG obtained at time of admission.      Assessment / Plan   Assessment:        Active Hospital Problems     Diagnosis      **Sepsis with acute respiratory failure without septic shock, due to unspecified organism, unspecified whether hypoxia or hypercapnia present      CAP (community acquired pneumonia)      Rhabdomyolysis      Nonrheumatic mitral valve insufficiency      Hypothyroidism (acquired)        Treatment Plan  The patient will be admitted to my service here at [facility].   Admit to critical care  Vitals per protocol  IVF NS 75 cc/hour   Sepsis bolus given in ER and tolerated  Empiric Rocephin/Doxycycline  Follow blood cultures  NIV > Nasal canula, high flow or CPAP/BiPAP prn        Check TSH/T4  May need synthroid IV while npo     Systolic murmur > Echocardiogram 2D  Leg edema, pain and elevated D Dimer > US Doppler LE    Lovenox full dose in the interim     DVT prophylaxis > Anticoagulated, OW consider SCD     Medical Decision Making  Number and Complexity of problems       Conditions and Status             MDM Data  External documents reviewed:   Cardiac tracing (EKG, telemetry) interpretation:   Radiology interpretation:   Labs reviewed:   Any tests that were considered but not ordered:      Decision rules/scores evaluated (example [id number], Wells, etc):           Care Planning  Shared decision making:   Code status and discussions     Disposition  Social Determinants of Health that impact treatment or disposition:        I confirmed that the patient's advanced care plan is present, code status is documented, and a surrogate decision maker is listed in the patient's medical record.      The patient's surrogate decision maker is Lizbethtia Du.           Electronically signed by John Denton MD, 05/24/23, 15:32 CDT.           Mahi Almendarez, RN   Registered Nurse  Cardiology     Plan of Care     Signed     Date of Service: 05/25/23 0620  Creation Time: 05/25/23 0620     Signed         Goal Outcome Evaluation:  Outcome Evaluation: Pt arouses to voice and follows commands, will only answer her name correctly. In/out cath'd @ 0400 with 1050mL output. Pt did not complain of further nausea or pain. Currently on 8L humidified NC. VSS. Lactic trending down.                       23 0445 -- 90 -- 115/42 -- -- -- -- 98   05/25/23 0430 -- 92 -- 113/42 -- -- -- -- 94   05/25/23 0415 -- 91 -- 114/46 -- -- -- -- 99   05/25/23 0400 98 (36.7) 90 13 115/44 Lying humidified;high-flow nasal cannula 6 -- 95   05/25/23 0345 -- 97 -- -- -- -- -- -- 64 Abnormal    05/25/ me Temp Pulse Resp BP Patient Position Device (Oxygen Therapy) Flow (L/min) Oxygen Concentration (%) SpO2   05/25/23 0925 -- -- 11 -- -- -- -- -- --   05/25/23 0900 -- 98 -- 117/51 Lying humidified;high-flow nasal cannula 8 -- 97   05/25/23  0800 -- 93 -- 107/57 -- humidified;high-flow nasal cannula 8 -- 100   05/25/2                    ifferential  Order: [id number] - Part of Panel Order [id number]  Status: Final result       Visible to patient: Yes (seen)    Specimen Information: Blood   0 Result Notes       Component  Ref Range & Units 06:33 1 d ago   WBC  3.40 - 10.80 10*3/mm3 31.48 High Critical  28.60 High    RBC  3.77 - 5.28 10*6/mm3 4.31 3.99   Hemoglobin  12.0 - 15.9 g/dL 9.5 Low  8.7 Low    Hematocrit  34.0 - 46.6 % 34.0 32.7 Low    MCV  79.0 - 97.0 fL 78.9 Low  82.0   MCH  26.6 - 33.0 pg 22.0 Low  21.8 Low    MCHC  31.5 - 35.7 g/dL 27.9 Low  26.6 Low    RDW  12.3 - 15.4 % 21.6 High  21.3 High    RDW-SD  37.0 - 54.0 fl 58.3 High  60.2 High    MPV  6.0 - 12.0 fL 12.3 High  12.2 High    Platelets  140 - 450 10*3/mm3 136 Low  167   Neutrophil %  42.7 - 76.0 % 92.7 High  91.2 High    Lymphocyte %  19.6 - 45.3 % 2.4 Low  3.4 Low    Monocyte %  5.0 - 12.0 % 3.6 Low  4.3 Low    Eosinophil %  0.3 - 6.2 % 0.0 Low  0.0 Low    Basophil %  0.0 - 1.5 % 0.2 0.1   Neutrophils, Absolute  1.70 - 7.00 10*3/mm3 29.17 High              ns abnormal data Basic Metabolic Panel  Order: [id number]  Status: Final result       Visible to patient: No (scheduled for 5/25/2023  9:58 AM)       Next appt: None    Specimen Information: Blood   0 Result Notes       Component  Ref Range & Units 08:32 1 d ago   Glucose  65 - 99 mg/dL 116 High  130 High    BUN  8 - 23 mg/dL 52 High  46 High    Creatinine  0.57 - 1.00 mg/dL 0.77 0.92   Sodium  136 - 145 mmol/L 133 Low  131 Low    Potassium  3.5 - 5.2 mmol/L 4.8 5.1 CM   Chloride  98 - 107 mmol/L 100 91 Low    CO2  22.0 - 29.0 mmol/L 18.0 Low  9.0 Low    Calcium  8.6 - 10.5 mg/dL 7.7 Low  8.8   BUN/Creatinine Ratio  7.0 - 25.0 67.5 High                Current Facility-Administered Medications   Medication Dose Route Frequency Provider Last Rate Last Admin    albuterol (PROVENTIL) nebulizer solution 0.083% 2.5 mg/3mL  2.5 mg  Nebulization Q6H - RT John Denton MD   2.5 mg at 05/25/23 0558    albuterol (PROVENTIL) nebulizer solution 0.083% 2.5 mg/3mL  2.5 mg Nebulization Q6H PRN John Denton MD        azithromycin (ZITHROMAX) 500 mg in sodium chloride 0.9 % 250 mL IVPB-VTB  500 mg Intravenous Q24H John Denton MD   500 mg at 05/24/23 1746    sennosides-docusate (PERICOLACE) 8.6-50 MG per tablet 2 tablet  2 tablet Oral BID John Denton MD   2 tablet at 05/24/23 2029    And    polyethylene glycol (MIRALAX) packet 17 g  17 g Oral Daily PRN John Denton MD        And    bisacodyl (DULCOLAX) EC tablet 5 mg  5 mg Oral Daily PRN John Denton MD        And    bisacodyl (DULCOLAX) suppository 10 mg  10 mg Rectal Daily PRN John Denton MD        cefTRIAXone (ROCEPHIN) 2 g in sodium chloride 0.9 % 100 mL IVPB  2 g Intravenous Q24H John Denton  mL/hr at 05/24/23 1639 2 g at 05/24/23 1639    Chlorhexidine Gluconate Cloth 2 % pads 1 application  1 application Topical Q24H John Denton MD   1 application at 05/25/23 0400    Enoxaparin Sodium (LOVENOX) syringe 60 mg  1 mg/kg Subcutaneous Q12H John Denton MD   60 mg at 05/25/23 0638    levothyroxine (SYNTHROID, LEVOTHROID) tablet 100 mcg  100 mcg Oral Q AM John Denton MD   100 mcg at 05/25/23 0556    mupirocin (BACTROBAN) 2 % nasal ointment 1 application  1 application Each Nare BID John Denton MD   1 application at 05/25/23 0929    nitroglycerin (NITROSTAT) SL tablet 0.4 mg  0.4 mg Sublingual Q5 Min PRN John Denton MD        ondansetron (ZOFRAN) injection 4 mg  4 mg Intravenous Q6H PRN John Denton MD   4 mg at 05/24/23 1841    sodium chloride 0.9 % flush 10 mL  10 mL Intravenous PRN John Denton MD        sodium chloride 0.9 % flush 10 mL  10 mL Intravenous Q12H John Denton MD   10 mL at 05/25/23 0993     sodium chloride 0.9 % flush 10 mL  10 mL Intravenous PRN John Denton MD        sodium chloride 0.9 % infusion 40 mL  40 mL Intravenous PRN John Denton MD        sodium chloride 0.9 % infusion  75 mL/hr Intravenous Continuous John Denton MD 75 mL/hr at 05/25/23 0640 75 mL/hr at 05/25/23 0640

## 2023-05-25 NOTE — CASE MANAGEMENT/SOCIAL WORK
Discharge Planning Assessment   [place]     Patient Name: Nati Nguyen  MRN: [id number]  Today's Date: 5/25/2023    Admit Date: 5/24/2023        Discharge Needs Assessment       Row Name 05/25/23 1212       Living Environment    People in Home alone    Current Living Arrangements home    Primary Care Provided by self    Provides Primary Care For no one    Family Caregiver if Needed none    Able to Return to Prior Arrangements yes       Resource/Environmental Concerns    Resource/Environmental Concerns none       Transition Planning    Patient/Family Anticipates Transition to home    Transportation Anticipated family or friend will provide       Discharge Needs Assessment    Readmission Within the Last 30 Days no previous admission in last 30 days    Equipment Currently Used at Home nebulizer;cane, straight    Concerns to be Addressed discharge planning    Equipment Needed After Discharge none    Discharge Coordination/Progress spoke to [employer] Legal Guardian who is POA; has RX coverage and PCP; independent at home prior to illness will follow for DC needs                   Discharge Plan    No documentation.                 Continued Care and Services - Admitted Since 5/24/2023    Coordination has not been started for this encounter.          Demographic Summary    No documentation.                  Functional Status    No documentation.                  Psychosocial    No documentation.                  Abuse/Neglect    No documentation.                  Legal    No documentation.                  Substance Abuse    No documentation.                  Patient Forms    No documentation.                     Nya Roque RN

## 2023-05-25 NOTE — PLAN OF CARE
Goal Outcome Evaluation:  Plan of Care Reviewed With: patient, caregiver        Progress: no change              SPEECH-LANGUAGE PATHOLOGY EVALUATION - SWALLOW  Subjective: The patient was seen on this date for a Clinical Swallow evaluation.  Patient was alert and cooperative.  Significant history: Presented due to altered mental status amd shortness of breath. Failed general RN dysphagia screening.   Objective: Textures given included ice, thin liquid, nectar thick liquid, honey thick liquid, and puree consistency. Solids not attempted due to generalized weakness, edentulous status, and RN report of intermittent lethargy with difficulty maintaining alertness with good endurance.   Assessment: Difficulties were noted with none of the above consistencies.  Observations: No overt s/s of aspiration observed.   SLP Findings:  Patient presents with mild to moderate oral dysphagia, without esophageal component.   Recommendations: Diet Textures: thin liquid, puree consistency food.  Medications should be taken whole or crushed with puree.   Recommended Strategies: Upright for PO, small bites and sips, alternate liquids and solids, and supervision with all PO. Oral care before breakfast, after all meals and PRN.  Other Recommended Evaluations: None    Dysphagia therapy is recommended. Rationale: Diet toleration and progression to least restrictive diet when medically appropriate.    Sosa Goodrich MS CCC-SLP 5/25/2023 12:23 CDT

## 2023-05-25 NOTE — PROGRESS NOTES
RT EQUIPMENT DEVICE RELATED - SKIN ASSESSMENT    RT Medical Equipment/Device:     NIV Mask:  Full-face    size: s    Skin Assessment:      Cheek:  Intact  Nose:  Intact  Mouth:  Intact    Device Skin Pressure Protection:  Skin-to-device areas padded:  Hydrocolloid nasal strips on bridge of nose    Nurse Notification:  Tiffanie Patel, CRT

## 2023-05-25 NOTE — PROCEDURES
"Intubation    Date/Time: 5/25/2023 6:39 PM  Performed by: John Denton MD  Authorized by: John Denton MD   Consent: The procedure was performed in an emergent situation.  Patient identity confirmed: verbally with patient and provided demographic data  Time out: Immediately prior to procedure a \"time out\" was called to verify the correct patient, procedure, equipment, support staff and site/side marked as required.  Indications: respiratory failure, hypoxemia and airway protection  Intubation method: direct  Patient status: unconscious  Preoxygenation: BVM  Sedatives: etomidate  Paralytic: none  Laryngoscope size: Mac 3  Tube size: 7.5 mm  Tube type: cuffed  Number of attempts: 1  Cricoid pressure: yes  Cords visualized: yes  Post-procedure assessment: CO2 detector  Breath sounds: equal and absent over the epigastrium  Cuff inflated: yes  ETT to lip: 20 cm  Tube secured with: ETT childers  Chest x-ray interpreted by radiologist.  Patient tolerance: patient tolerated the procedure well with no immediate complications      "

## 2023-05-25 NOTE — PROGRESS NOTES
RT EQUIPMENT DEVICE RELATED - SKIN ASSESSMENT    RT Medical Equipment/Device:     NIV Mask:  Full-face    size: small    Skin Assessment:      Nose:  Intact    Device Skin Pressure Protection:  Skin-to-device areas padded:  Hydrocolloid nasal strips on bridge of nose    Nurse Notification:  Tiffanie Shah, RRT

## 2023-05-25 NOTE — PROGRESS NOTES
Malnutrition Severity Assessment    Patient Name:  Nati Nguyen  YOB: 1955  MRN: [id number]  Admit Date:  5/24/2023    Patient meets criteria for : Moderate (non-severe) Malnutrition (secondary signs/symptoms: rhabdomyolysis, severe weakness, AMS, dry lips and mucous membranes, kaden score 13)    Comments:  Pt with limited interaction. She would shake her head yes or no and answer a few questions. She had apparently not been seen for ~7 days and POA requested a wellness check. Pt was found sitting in a chair listless and staring. She said she did like Boost supplements. Will order Boost Plus with breakfast and dinner and magic cup with lunch.     Malnutrition Severity Assessment  Malnutrition Type: Acute Disease or Injury - Related Malnutrition  Malnutrition Type (last 8 hours)       Malnutrition Severity Assessment       Row Name 05/25/23 1355       Malnutrition Severity Assessment    Malnutrition Type Acute Disease or Injury - Related Malnutrition      Row Name 05/25/23 1350       Insufficient Energy Intake     Insufficient Energy Intake Findings --  suspected poor intake for the last several days prior to admission, unable to verify      Row Name 05/25/23 1355       Unintentional Weight Loss     Unintentional Weight Loss Findings None  No sig weight changes noted in review of other facility weights      Row Name 05/25/23 1357       Muscle Loss    Loss of Muscle Mass Findings Moderate    Catholic Region Moderate - slight depression    Clavicle Bone Region Moderate - some protrusion in females, visible in males    Acromion Bone Region Moderate - acromion may slightly protrude    Scapular Bone Region Moderate - mild depression, bones may show slightly    Patellar Region Moderate - patella more prominent, less muscle definition around patella    Posterior Calf Region Moderate - some roundness, slight firmness      Row Name 05/25/23 1352       Fat Loss    Subcutaneous Fat Loss Findings Moderate    Upper  Arm Region Moderate - some fat tissue, not ample      Row Name 05/25/23 [facility]       Fluid Accumulation (Edema)    Fluid Acumulation Findings Moderate    Fluid Accumulation  Moderate equals 2+ pitting edema      Row Name 05/25/23 1358       Declining Functional Status    Declining Functional Status Findings Measurably Reduced      Row Name 05/25/23 [facility]       Criteria Met (Must meet criteria for severity in at least 2 of these categories: M Wasting, Fat Loss, Fluid, Secondary Signs, Wt. Status, Intake)    Patient meets criteria for  Moderate (non-severe) Malnutrition  secondary signs/symptoms: rhabdomyolysis, severe weakness, AMS, dry lips and mucous membranes, kaden score 13                    Electronically signed by:  Amy Cisneros RDN, LD  05/25/23 14:11 CDT

## 2023-05-25 NOTE — PLAN OF CARE
Goal Outcome Evaluation:              Outcome Evaluation: Pt arouses to voice and follows commands, will only answer her name correctly. In/out cath'd @ 0400 with 1050mL output. Pt did not complain of further nausea or pain. Currently on 8L humidified NC. VSS. Lactic trending down.

## 2023-05-26 PROBLEM — N17.9 AKI (ACUTE KIDNEY INJURY): Status: ACTIVE | Noted: 2023-01-01

## 2023-05-26 PROBLEM — I25.119 CORONARY ARTERY DISEASE INVOLVING NATIVE CORONARY ARTERY OF NATIVE HEART WITH ANGINA PECTORIS: Chronic | Status: ACTIVE | Noted: 2023-01-01

## 2023-05-26 PROBLEM — K72.00 SHOCK LIVER: Status: ACTIVE | Noted: 2023-01-01

## 2023-05-26 NOTE — PROGRESS NOTES
RT EQUIPMENT DEVICE RELATED - SKIN ASSESSMENT    RT Medical Equipment/Device:     ETT Ni/Anchorfast    Skin Assessment:      Cheek:  Intact  Neck:  Intact  Nose:  Intact  Lips:  Intact  Mouth:  Intact    Device Skin Pressure Protection:  Skin-to-device areas padded:  Anchorfast    Nurse Notification:  No Phyllis Dakin, RRT

## 2023-05-26 NOTE — PROCEDURES
Nursing staff requested arterial line be placed.  We are trying to titrate pressors.    Respiratory has been unable to get an arterial blood gas from the radial position throughout the course of her stay.  She has been getting VBG was drawn.  Knowing this, I tried to place a right femoral arterial line.  I made 2 attempts based on landmarks because her pulse was readily palpable.  I would get short flashes of arterial blood, but never anything that I could fully cannulate and placed a wire into.    The charge nurse got the ultrasound.  I made 3 more attempts under ultrasound guidance.  I again would get short flashes of arterial blood.  Her artery appears to be immediately adjacent to her vein.  I was able to fully cannulate the vein at 1 point and tried to place a guidewire as a landmark to then make another attempt at arterial line placement.  The guidewire would not feed at all.  I also was inhibited due to the fact that the patient cannot lie completely flat at present without desaturating and going into respiratory distress.    I pulled the wire from the venous attempt because she already has a central line on the left.    Unfortunately, I could not gain access to the right femoral artery.    We will try to continue to titrate based on cuff pressures.    Electronically signed by Holger Mcknight DO, 05/26/23, 1:46 AM CDT.

## 2023-05-26 NOTE — CONSULTS
Attempted to place an arterial line to right radial.  All options extremely small. Unable to thread wire.  Nati pt's nurse, updated.

## 2023-05-26 NOTE — CONSULTS
[facility] [place] PULMONARY AND CRITICAL CARE CONSULT - [facility]    Nati Nguyen   MR# [id number]  Acct# [id number]  5/25/2023   19:26 CDT    Referring Provider: John Denton,*    Chief Complaint: Mechanically ventilated    HPI: We are consulted by John Denton,* to see this 68 y.o. female currently receiving mechanical ventilation for sepsis, septic shock and hypotension and she was unable to control her airway.    Patient was seen in the cardiac care unit as a pulmonary consult today.  She was unable to provide any history because she was already intubated.  From reviewing the chart records it appears patient has known history of hypertension, hypothyroidism and chronic obstructive pulmonary disease with prior history of tobacco abuse.  She was at her home and family could not reach her for almost a week.  Her power of  requested for a well care visit and patient was noted to be disoriented with shortness of breath and was taken to the emergency department.  She was noted to have high proBNP elevated glucose and low serum sodium with elevated liver enzymes on arrival.  He also was noted to have cardiomegaly and bilateral pulmonary infiltrate with left lower lobe infiltrate suggesting pneumonia with layering pleural effusions.  Her D-dimer was significantly elevated.  She also has significant leukocytosis.  Arterial blood gas did not show much hypoxia.  She was placed on high flow oxygen initially but later needed intubation.    Patient was admitted as an inpatient specialist team and was given fluid bolus.  She was getting IV fluid saline 75 cc an hour but it was decreased to 50 cc an hour.  She was noted to have bilateral ankle edema.  Hypotensive and also needed Levophed for hypotension.  This afternoon patient has significant drop in blood pressure and became unresponsive.  She was initially started on BiPAP switched from regular oxygen.  Due to rapidly deteriorating clinical  status she was intubated at the bedside and a central line was placed by Dr. Kilpatrick.  Patient was started on mechanical ventilation and pulmonary consult was requested.  Medical records it appears patient is a smoker and was smoking at home and was on DuoNeb nebulizer I did not see any long-term inhalers and I was not sure whether patient was on oxygen or not.    At time of my visit patient was stable on assist-control volume control mechanical ventilation tidal volume 440 rate of 20 PEEP of 5 and FiO2 100% which was decreased to 80% oxygen saturation around 100% he was getting started on propofol drip and was waking up.  Further history could be obtained from the patient.  Her chest x-ray showed cardiomegaly and bilateral pulm input in the left lower lobe infiltrate.    Past Medical History   has no past medical history on file.   has no past surgical history on file.  Allergies   Allergen Reactions    Aspirin Unknown - Low Severity     Medications  albuterol, 2.5 mg, Nebulization, Q6H - RT  azithromycin, 500 mg, Intravenous, Q24H  cefTRIAXone, 2 g, Intravenous, Q24H  chlorhexidine, 15 mL, Mouth/Throat, Q12H  Chlorhexidine Gluconate Cloth, 1 application, Topical, Q24H  [START ON 5/26/2023] enoxaparin, 40 mg, Subcutaneous, Q24H  famotidine, 20 mg, Intravenous, BID  levothyroxine, 100 mcg, Oral, Q AM  mupirocin, 1 application, Each Nare, BID  senna-docusate sodium, 2 tablet, Oral, BID  sodium chloride, 10 mL, Intravenous, Q12H      norepinephrine, 0.02-0.3 mcg/kg/min, Last Rate: 0.3 mcg/kg/min (05/25/23 1829)  propofol, 5-50 mcg/kg/min, Last Rate: Stopped (05/25/23 1830)  sodium chloride, 50 mL/hr, Last Rate: 50 mL/hr (05/25/23 1556)      Social History   reports that she has been smoking cigarettes. She does not have any smokeless tobacco history on file. She reports that she does not currently use alcohol. She reports that she does not use drugs.  Family History  family history is not on file.  Physical  Exam:  Temp:  [98 °F (36.7 °C)-98.6 °F (37 °C)] 98 °F (36.7 °C)  Heart Rate:  [87-98] 92  Resp:  [11-20] 20  BP: ()/(36-75) 74/48  FiO2 (%):  [100 %] 100 %  Intake/Output Summary (Last 24 hours) at 5/25/2023 1926  Last data filed at 5/25/2023 1638  Gross per 24 hour   Intake 2126.45 ml   Output 1700 ml   Net 426.45 ml         05/24/23  1506 05/25/23  0400   Weight: 52.8 kg (116 lb 4.8 oz) 53.3 kg (117 lb 6.4 oz)     SpO2 Percentage    05/25/23 1611 05/25/23 1655 05/25/23 1825   SpO2: 99% 100% (!) 72%     Body mass index is 20.15 kg/m².  Vent Settings     Patient is intubated with 7.5 endotracheal tube, tidal volume 440, rate 20, PEEP of 5, FiO2 100% with oxygen saturation 100%     Resp Rate (Set): 20  Pressure Support (cm H2O): 10 cm H20  FiO2 (%): 100 %  PEEP/CPAP (cm H2O): 5 cm H20  Minute Ventilation (L/min) (Obs): 8.46 L/min  Resp Rate (Observed) Vent: 21     I:E Ratio (Obs): 1:2.7  PIP Observed (cm H2O): 16 cm H2O  Plateau Pressure (cm H2O): 24 cm H2O      Physical Exam  Vitals and nursing note reviewed.   Constitutional:       General: She is not in acute distress.     Appearance: She is ill-appearing and toxic-appearing. She is not diaphoretic.      Comments: Middle-aged  female looks tired and exhausted orally intubated on mechanical ventilation   HENT:      Head: Normocephalic and atraumatic.      Right Ear: Tympanic membrane and external ear normal. There is no impacted cerumen.      Left Ear: Tympanic membrane and external ear normal. There is no impacted cerumen.      Nose: Nose normal. No congestion or rhinorrhea.      Mouth/Throat:      Mouth: Mucous membranes are moist.      Pharynx: Oropharynx is clear. No oropharyngeal exudate or posterior oropharyngeal erythema.   Eyes:      General: No scleral icterus.        Right eye: No discharge.         Left eye: No discharge.      Extraocular Movements: Extraocular movements intact.      Pupils: Pupils are equal, round, and reactive to light.    Neck:      Vascular: No carotid bruit.   Cardiovascular:      Rate and Rhythm: Regular rhythm. Tachycardia present.      Pulses: Normal pulses.      Heart sounds: Normal heart sounds. No murmur heard.    No friction rub. No gallop.   Pulmonary:      Effort: No respiratory distress.      Breath sounds: No stridor. Rales present. No wheezing.      Comments: Decreased breath sound bilaterally.  Chest:      Chest wall: No tenderness.   Abdominal:      General: Abdomen is flat. Bowel sounds are normal. There is no distension.      Palpations: There is no mass.      Tenderness: There is no abdominal tenderness. There is no right CVA tenderness, left CVA tenderness or guarding.   Genitourinary:     Comments: Not examined  Musculoskeletal:         General: No swelling, tenderness or deformity. Normal range of motion.      Cervical back: Normal range of motion and neck supple. No rigidity or tenderness.      Right lower leg: Edema present.      Left lower leg: Edema present.   Lymphadenopathy:      Cervical: No cervical adenopathy.   Skin:     General: Skin is warm and dry.      Capillary Refill: Capillary refill takes less than 2 seconds.      Coloration: Skin is pale. Skin is not jaundiced.      Findings: Bruising present. No lesion or rash.   Neurological:      General: No focal deficit present.      Mental Status: Mental status is at baseline.      Cranial Nerves: No cranial nerve deficit.      Sensory: No sensory deficit.      Motor: Weakness present.      Deep Tendon Reflexes: Reflexes normal.      Comments: Could not be assessed   Psychiatric:      Comments: Could not be assessed.     Result Review  Results from last 7 days   Lab Units 05/25/23  0633 05/24/23  1024   WBC 10*3/mm3 31.48* 28.60*   HEMOGLOBIN g/dL 9.5* 8.7*   PLATELETS 10*3/mm3 136* 167     Results from last 7 days   Lab Units 05/25/23  0832 05/24/23  1024   SODIUM mmol/L 133* 131*   POTASSIUM mmol/L 4.8 5.1   CO2 mmol/L 18.0* 9.0*   BUN mg/dL 52* 46*    CREATININE mg/dL 0.77 0.92   MAGNESIUM mg/dL  --  2.3   GLUCOSE mg/dL 116* 130*     Results from last 7 days   Lab Units 05/25/23  1830 05/24/23  1601 05/24/23  1119   PH, ARTERIAL pH units  --  7.384 7.152*   PCO2, ARTERIAL mm Hg  --  28.8* 28.0*   PO2 ART mm Hg  --  143.0* 72.2*   FIO2 % 100 80 100     Microbiology Results (last 10 days)       Procedure Component Value - Date/Time    Blood Culture - Blood, Arm, Left [[id number]]  (Abnormal) Collected: 05/24/23 1100    Lab Status: Preliminary result Specimen: Blood from Arm, Left Updated: 05/25/23 1138     Blood Culture Abnormal Stain     Gram Stain Aerobic Bottle Gram positive bacilli    Narrative:      Blood culture does not meet the specified criteria for PCR identification.  If pregnant, immunocompromised, or clinical concern for meningitis, call lab to run BCID for Listeria monocytogenes.    Blood Culture - Blood, Arm, Right [[id number]]  (Normal) Collected: 05/24/23 1100    Lab Status: Preliminary result Specimen: Blood from Arm, Right Updated: 05/25/23 1116     Blood Culture No growth at 24 hours          Lab Results   Component Value Date    PROBNP 12,071.0 (H) 05/24/2023     Recent radiology:   Imaging Results (Last 72 Hours)       Procedure Component Value Units Date/Time    XR Chest 1 View [[id number]] Collected: 05/25/23 1905     Updated: 05/25/23 1912    Narrative:      Frontal upright radiograph of the chest 5/25/2023 6:42 PM CDT     HISTORY: Status post intubation     COMPARISON: Chest x-ray dated 05/24/2023.     FINDINGS:      Endotracheal tube is in good position. Prominent cardiomegaly. Bilateral  interstitial coarsening and hazy perihilar airspace opacities suggesting  bilateral pulmonary edema. Bilateral trace layering pleural effusions.  No pneumothorax. Enteric tube courses below the diaphragm with tip  beyond the field of view.       Impression:      1. Endotracheal tube is in good position. Lungs well-expanded.  2. Cardiomegaly with bilateral  pulmonary edema and layering trace  pleural effusions.        This report was finalized on 05/25/2023 19:09 by Dr Fco Cosby, .    XR Abdomen KUB [[id number]] Collected: 05/25/23 1901     Updated: 05/25/23 1905    Narrative:      XR ABDOMEN KUB- 5/25/2023 6:43 PM CDT     HISTORY: OG; A41.9-Sepsis, unspecified organism; R65.20-Severe sepsis  without septic shock; J96.00-Acute respiratory failure, unspecified  whether with hypoxia or hypercapnia; J18.9-Pneumonia, unspecified  organism; R77.8-Other specified abnormalities of plasma proteins;  R41.82-Altered mental status, unspecified; R13.11-Dysphagia, oral phase     FINDINGS:  Feeding tube is seen with the tip located in the upper  gastric body.     Moderate gastric distention. No gross free air in the upper abdomen.  Multiple air-filled small bowel loops in the upper and mid abdomen.       Impression:      Enteric tube tip located in the upper gastric body.     This report was finalized on 05/25/2023 19:02 by Dr Fco Cosby, .    US Venous Doppler Lower Extremity Bilateral (duplex) [[id number]] Collected: 05/25/23 1245     Updated: 05/25/23 1253    Narrative:      History: Swelling       Impression:      Impression: There is no evidence of deep venous thrombosis or  superficial thrombophlebitis of right or left lower extremities.     Comments: Bilateral lower extremity venous duplex exam was performed  using color Doppler flow, Doppler waveform analysis, and grayscale  imaging, with and without compression. There is no evidence of deep  venous thrombosis in the common femoral, superficial femoral, popliteal,  peroneal, anterior tibial, and posterior tibial veins bilaterally. No  thrombus is identified in the saphenofemoral junctions and greater  saphenous veins bilaterally.         This report was finalized on 05/25/2023 12:45 by Dr. Jairo Martines MD.    CT Abdomen Pelvis With Contrast [[id number]] Collected: 05/24/23 1332     Updated: 05/24/23 1356     Narrative:      EXAMINATION: CT ABDOMEN PELVIS W CONTRAST-      5/24/2023 12:52 PM CDT     HISTORY: Sepsis. Abdominal pain. Chest pain. Shortness of breath.     In order to have a CT radiation dose as low as reasonably achievable  Automated Exposure Control was utilized for adjustment of the mA and/or  KV according to patient size.     DLP in mGycm= 371     The CT scan of the abdomen and pelvis is performed after intravenous  contrast enhancement.     Images are acquired in axial plane and subsequent reconstruction in  coronal and sagittal planes.     There is no previous similar study for comparison.     There are motion artifacts which are noted on diagnostic quality of the  study.     The correlation made with chest radiograph obtained earlier today.     The chest is separately reviewed and reported in CT scan of the chest  which is to follow.     There is severe fatty infiltration of the liver. There is moderate  hepatomegaly. The spleen is normal.     The gallbladder is surgically absent.     Pancreas is normal.     There are hyper enhancing but normal shape adrenal glands bilaterally.     Moderate lobulation and renal contour bilaterally seen. No discrete  mass. There is a symmetrical nephrogram. No calculi. No hydronephrosis.  The ureters are not optimally visualized or evaluated. The urinary  bladder is significantly distended. No intrinsic abnormality.     A small uterus is seen. No adnexal masses. There is significantly  dilated and tortuous pelvic veins around the uterus and ovaries and  significantly enlarged 18 veins bilaterally.     Moderately distended thick-walled stomach is seen with significant  enhancement of the stomach wall. The stomach is full of fluid. There is  diffuse thickening and enhancement of the small bowel wall. No  significant dilatation except for the distal ileum which is mildly  dilated with fluid. The appendix is not clearly visualized. Moderate gas  and stool is seen in the  colon. Moderate thickening of the wall of the  entire colon is seen. No obstruction.     Severe atheromatous changes of the abdominal aorta iliac and femoral  arteries. Atheromatous plaques are seen at the origin of the celiac and  superior mesenteric artery. There is significant focal stenosis of the  proximal celiac trunk. Large atheromatous plaques are seen at the origin  of both renal arteries, right more than the left. Significant stenosis  of the proximal right renal artery.     There is no evidence of abdominal lymphadenopathy.     There is small amount of fluid in the abdomen and pelvis.     There is diffuse infiltration of subcutaneous fat with fluid  accumulation in the abdominal wall more pronounced inferiorly and  laterally and posteriorly. This may represent a fluid overload/anasarca.     Images reviewed in bone window show chronic degenerative changes of the  lower lumbar spine. No focal bony abnormality or a bone lesion.       Impression:      1. The hyper enhancing adrenal glands, thickened and enhanced stomach  wall, wall/mucosa of small and large bowel represent a CT hypoperfusion  complex which may be due to hypotension or a septic shock.  2. Severe hepatic steatosis.  3. Severe atheromatous changes of the abdominal aorta and iliac  arteries. Significant stenosis of the proximal celiac and right renal  artery.  4. Small amount of abdominal and pelvic fluid, abdominal wall  edema/fluid accumulation may represent fluid overload/anasarca.  5. Thickening of the small and large bowel may also be inflammatory or  ischemic process.  6. Significantly dilated and tortuous utero-ovarian veins and  significant large and distended ovale/the gallbladder planes may  represent pelvic congestion syndrome and or a component of CT  hypoperfusion complex.                             This report was finalized on 05/24/2023 13:53 by Dr. Abril Ribera MD.    CT Angiogram Chest [[id number]] Collected: 05/24/23 6617      Updated: 05/24/23 7662    Narrative:      EXAM/TECHNIQUE: CT chest angiography with 3D MIP images with IV contrast     INDICATION: Shortness of breath     COMPARISON: None     DLP: 162 mGy cm. Automated exposure control was also utilized to  decrease patient radiation dose.     FINDINGS:     No evidence of pulmonary embolus. Main pulmonary artery is nondilated.  Thoracic aorta is nonaneurysmal and contains heavy atherosclerotic  calcification and plaque. 4 chamber cardiomegaly with hepatic venous  reflux of IV contrast. The hepatic veins are dilated. No pericardial  effusion. Mild coronary artery atherosclerotic calcification     Central airways are clear. Small to moderate bilateral pleural effusions  with overlying atelectasis. Mild bilateral interlobular septal  thickening with faint groundglass opacity. Severe emphysema. No enlarged  thoracic lymph nodes. 4 mm LEFT upper lobe pulmonary nodule on image 54.     No acute chest wall soft tissue abnormality. Small perihepatic ascites.  No acute osseous finding.       Impression:         1.  No evidence of pulmonary embolus. Nonaneurysmal thoracic aorta with  heavy atherosclerotic plaque and calcification.     2.  4 chamber cardiomegaly with marked hepatic venous reflux of IV  contrast and distended hepatic veins. Findings are suggestive of  congestive heart failure.     3.  Small-to-moderate bilateral pleural effusions with overlying  atelectasis.     4.  Bilateral interlobular septal thickening with faint groundglass  opacity, favored to represent mild interstitial pulmonary edema.     5.  Severe emphysema.     6.  4 mm LEFT upper lobe pulmonary nodule. Recommend a follow-up chest  CT in one year based on Fleischner criteria.  This report was finalized on 05/24/2023 13:52 by Dr. Tony Larios MD.    CT Head Without Contrast [[id number]] Collected: 05/24/23 1330     Updated: 05/24/23 1338    Narrative:      EXAMINATION:  CT HEAD WO CONTRAST-  5/24/2023 12:52 PM CDT      HISTORY: Mental status change, unknown cause. CTA chest and CT  abdomen/pelvis also obtained.     TECHNIQUE: Multiple axial images were obtained through the brain without  contrast infusion. Multiplanar images were reconstructed.     DLP: 1149 mGy-cm. Automated dosage reduction technique was utilized to  reduce patient dosage.     COMPARISON: No comparison study.     FINDINGS: There are no hemorrhage, edema or mass effect. There is mild  dilatation of the lateral ventricles that may be developmental or  related to mild atrophy. The paranasal sinuses are clear. The mastoid  air cells are clear. No calvarial fracture is seen. A linear low-density  shading artifact is demonstrated.          Impression:      1. No hemorrhage, edema or mass effect.  2. Mild dilatation of the lateral ventricles may be developmental or  related to mild atrophy.        The full report of this exam was immediately signed and available to the  emergency room. The patient is currently in the emergency room.     This report was finalized on 05/24/2023 13:33 by Dr. Leo Grover MD.    XR Chest 1 View [[id number]] Collected: 05/24/23 1203     Updated: 05/24/23 1207    Narrative:      EXAMINATION: XR CHEST 1 VW-     5/24/2023 11:47 AM CDT     HISTORY: Shortness of breath     1 view chest x-ray.     Cardiomegaly.  Aortic arch calcification.     Chronic interstitial lung disease.     Fully expanded and clear right lung.  Clear left upper lung.     Left lower lobe consolidation compatible with pneumonia and small amount  of pleural fluid.     Summary:  1. Left lower lobe pneumonia.  This report was finalized on 05/24/2023 12:04 by Dr. Adelfo Reddy MD.          Personal review of imaging : CXR shows cardiomegaly with bilateral pulmonary infiltrate and endotracheal tube in place.  This is most likely pulm edema but left lower lobe pneumonia cannot be ruled out.  Other test results (not lab or imaging): Results for orders placed during the hospital  encounter of 05/24/23    Adult Transthoracic Echo Complete W/ Cont if Necessary Per Protocol    Interpretation Summary    Left ventricular systolic function is severely decreased. Left ventricular ejection fraction appears to be 26 - 30%.    The left ventricular cavity is mildly dilated.    Left ventricular diastolic function is consistent with (grade II w/high LAP) pseudonormalization.    Mildly reduced right ventricular systolic function noted.    There is moderate biatrial enlargement.    Moderate to severe mitral valve regurgitation is present with a posteriorly-directed jet noted.    Moderate tricuspid valve regurgitation is present.    Estimated right ventricular systolic pressure from tricuspid regurgitation is normal (<35 mmHg).    Bilateral pleural effusions are noted.  Reviewed and agree with current interpretation  Independent review of ekg: Done    Problem List as identified by Epic (may contain historical, inactive problems)    Sepsis with acute respiratory failure without septic shock, due to unspecified organism, unspecified whether hypoxia or hypercapnia present    CAP (community acquired pneumonia)    Rhabdomyolysis    Nonrheumatic mitral valve insufficiency    Hypothyroidism (acquired)    Moderate Malnutrition (HCC)    Pulmonary Assessment:  SEVERE ACUTE RESPIRATORY FAILURE REQUIRING MECHANICAL VENTILATION.  This is a threat to life or pulmonary function, high risk of morbidity from additional diagnostic testing or treatment, due to sepsis and heart failure  Oral intubation on mechanical ventilation  Sepsis with septic shock requiring pressor  Bacteremia with positive blood culture for gram-positive cocci  Congestive diastolic heart failure  Pleural effusions bilateral  Left lower lobe pneumonia  Chronic obstructive pulmonary disease history of tobacco abuse  Acute kidney injury  Moderate to severe mitral valve regurgitation and tricuspid regurgitation.  Chronic congestive diastolic heart  failure  Chronic malnutrition  Anxiety and depression  Elevated liver enzymes likely shock liver    Recommend/plan:   Ventilator order set, including intravenous narcotics, benzodiazepines (that is controlled drugs) for sedation and ventilator tolerance  Chest X-ray in the morning tomorrow  Arterial blood gas analysis in the morning tomorrow  Additional plan:  Patient presented with pneumonia sepsis and heart failure.  Her respiratory status worsened and she needed intubation and continued mechanical ventilation  Reviewed ventilator settings.  Continue current assist-control volume control ventilation.  Titrate PEEP and FiO2 as tolerated to keep oxygen saturation more than 92%.  Continue current ventilator settings and to support her breathing trial when she is more stable  She is currently sedated with the propofol which will be continued.  She is a smoker and probably has underlying COPD.  I advised to start the patient on Pulmicort and DuoNeb.  Patient has a positive blood culture with gram-positive cocci with very high leukocyte count likely related to underlying pneumonia and sepsis.  MRSA and gram-negative organisms cannot be ruled out.  Antibiotic coverage changed from Rocephin and azithromycin to cefepime and vancomycin.  Urine for Legionella and pneumococcal antigen is ordered.  Respiratory cultures will be sent.  MRSA screen is also ordered  Monitor lab work and adjust antibiotics accordingly.  Patient is requiring Levophed for hypotension which will be titrated  She has elevated liver enzymes likely from shock liver.  We will monitor the liver enzymes closely  To keep mean arterial pressure 65 or above.  IV fluid decreased to 50 cc an hour due to high BNP.  Echocardiogram showed significant mitral and tricuspid elevation but no significant pulmonary hypertension.  Outpatient cardiology work-up and pulmonary work-up will be needed.  She was an active smoker and will need outpatient PFT for underlying  COPD.  Her COVID screening is negative and her vaccination status is unknown most likely she is unvaccinated  Continue DVT and stress ulcer prophylaxis.  Pain and anxiety control.  Repeat labs and imaging studies from pulmonary team.  I we will check a procalcitonin with morning labs  CODE STATUS: Full.  Overall prognosis: Guarded.  We appreciate the consult and we will follow.  Total time spent in seeing this patient was 45 minutes    Thank you for this consult.  We will follow along.    Electronically signed by     Alize Mckeon MD   Pulmonologist/Intensivist   on 5/25/2023 at 19:26 CDT

## 2023-05-26 NOTE — CONSULTS
Nutrition Services    Patient Name:  Nati Nguyen  YOB: 1955  MRN: [id number]  Admit Date:  5/24/2023    NTN vent assessment complete. Pt on vent support with sepsis with acute respiratory failure. If pt requires prolonged vent support and/or NPO status, may benefit from alternate means of nutrition support. If AMONS [enteral/parenteral] desired, RD available for recommendations. NTN following per protocol.     If EN desired, recommend: Peptamen 1.5 to start at 15mL/hour. Would increase rate by 10mL/hour every 12 hours as tolerated, to a goal rate of 45mL/hour. Recommend free water flushes of 25mL/hour via pump. Based on [place] [employer] equation with current vent settings, TF would meet 99% of est'd kcal needs and 100% of est'd protein range. If TF desired, please consult dietitian for orders.     Electronically signed by:  Amy Cisneros RDN, LD  05/26/23 08:43 CDT

## 2023-05-26 NOTE — PROGRESS NOTES
[facility] Medicine Services  INPATIENT PROGRESS NOTE    Patient Name: Nati Nguyen  Date of Admission: 5/24/2023  Today's Date: 05/26/23  Length of Stay: 2  Primary Care Physician: Josué Galicia MD    Subjective   Chief Complaint: Hypoxemia  HPI   Intubated last evening. Unresponsive, requiring pressors. Anuric this AM.    Review of Systems   All pertinent negatives and positives are as above. All other systems have been reviewed and are negative unless otherwise stated.     Objective    Temp:  [96.1 °F (35.6 °C)-99.3 °F (37.4 °C)] 99.3 °F (37.4 °C)  Heart Rate:  [] 89  Resp:  [11-31] 31  BP: ()/() 107/55  FiO2 (%):  [100 %] 100 %  Physical Exam  Vitals reviewed.   Constitutional:       General: She is not in acute distress.     Appearance: She is well-developed. She is ill-appearing and toxic-appearing.   HENT:      Head: Normocephalic and atraumatic.      Right Ear: External ear normal.      Left Ear: External ear normal.      Mouth/Throat:      Mouth: Mucous membranes are dry.      Pharynx: Oropharynx is clear.   Eyes:      General:         Right eye: No discharge.         Left eye: No discharge.      Extraocular Movements: Extraocular movements intact.      Conjunctiva/sclera: Conjunctivae normal.      Pupils: Pupils are equal, round, and reactive to light.   Neck:      Vascular: No JVD.   Cardiovascular:      Rate and Rhythm: Normal rate and regular rhythm.      Pulses: Normal pulses.      Heart sounds: Normal heart sounds. No murmur heard.    No friction rub. No gallop.   Pulmonary:      Effort: Pulmonary effort is normal. No respiratory distress.      Breath sounds: No stridor. Rales present. No wheezing or rhonchi.   Chest:      Chest wall: No tenderness.   Abdominal:      General: Bowel sounds are normal. There is no distension.      Palpations: Abdomen is soft.      Tenderness: There is no abdominal tenderness. There is no guarding or rebound.       Hernia: No hernia is present.   Musculoskeletal:         General: No swelling, tenderness or deformity. Normal range of motion.      Cervical back: Normal range of motion and neck supple. No rigidity or tenderness. No muscular tenderness.      Right lower leg: No edema.      Left lower leg: No edema.   Skin:     General: Skin is dry.      Findings: No erythema or rash.   Neurological:      General: No focal deficit present.      Mental Status: She is alert. She is disoriented.      Cranial Nerves: No cranial nerve deficit.      Sensory: No sensory deficit.      Motor: Weakness present. No abnormal muscle tone.      Deep Tendon Reflexes: Reflexes normal.     Results Review:  I have reviewed the labs, radiology results, and diagnostic studies.    Laboratory Data:   Results from last 7 days   Lab Units 05/26/23  0818 05/25/23  0633 05/24/23  1024   WBC 10*3/mm3 28.54* 31.48* 28.60*   HEMOGLOBIN g/dL 8.1* 9.5* 8.7*   HEMATOCRIT % 30.8* 34.0 32.7*   PLATELETS 10*3/mm3 85* 136* 167          Results from last 7 days   Lab Units 05/26/23  0818 05/25/23  0832 05/24/23  1024   SODIUM mmol/L 138 133* 131*   POTASSIUM mmol/L 6.3* 4.8 5.1   CHLORIDE mmol/L 102 100 91*   CO2 mmol/L 10.0* 18.0* 9.0*   BUN mg/dL 50* 52* 46*   CREATININE mg/dL 1.14* 0.77 0.92   CALCIUM mg/dL 6.5* 7.7* 8.8   BILIRUBIN mg/dL  --   --  3.7*   ALK PHOS U/L  --   --  239*   ALT (SGPT) U/L  --   --  510*   AST (SGOT) U/L  --   --  1,197*   GLUCOSE mg/dL 16* 116* 130*         Culture Data:   No results found for: BLOODCX, URINECX, WOUNDCX, MRSACX, RESPCX, STOOLCX    Radiology Data:   Imaging Results (Last 24 Hours)       Procedure Component Value Units Date/Time    XR Chest 1 View [[id number]] Collected: 05/26/23 1032     Updated: 05/26/23 1041    Narrative:      HISTORY: ETT placement/advancement     CXR: Frontal view the chest obtained     COMPARISON: 05/26/2023     FINDINGS: Endotracheal tube is been advanced. The tip is now positioned  approximately 4  cm above the jean. Enteric tube descends into the  abdomen. Cardiac pacer pads project over the chest.     Stable left pleural effusion with left lower lobe opacities. Similar  prominence of interstitial markings. No pneumothorax. Thoracic aortic  calcification. Probable right basilar atelectasis. Old healed right  ninth anterolateral rib fracture.       Impression:      1. Advancement of the endotracheal tube with tip now 4 cm above the  jean. Otherwise stable chest.  This report was finalized on 05/26/2023 10:38 by Dr. Mary Gaxiola MD.    XR Chest 1 View [[id number]] Collected: 05/26/23 0753     Updated: 05/26/23 0758    Narrative:      EXAM/TECHNIQUE: XR CHEST 1 VW-     INDICATION: Intubated Patient; A41.9-Sepsis, unspecified organism;  R65.20-Severe sepsis without septic shock; J96.00-Acute respiratory  failure, unspecified whether with hypoxia or hypercapnia;  J18.9-Pneumonia, unspecified organism; R77.8-Other specified  abnormalities of plasma proteins; R41.82-Altered mental status,  unspecified; R13.11-Dysphagia, oral phase     COMPARISON: Prior day     FINDINGS:     Endotracheal tube is 7.5 cm above the jean. Enteric tube terminates  below the diaphragm out of the field of view. Resuscitation pads overlie  the chest.      Cardiac silhouette is enlarged but stable. No change in bilateral  pleural effusions with overlying atelectasis, greatest on the LEFT. No  new airspace opacity. No visible pneumothorax. Decreased diffuse  interstitial opacity.       Impression:         1.  Endotracheal tube is 7.5 cm above the jean. Consider slight  advancement.  2.  Decrease in interstitial opacity/edema.  3.  No change in bilateral pleural effusions with overlying atelectasis,  greatest on the LEFT.  This report was finalized on 05/26/2023 07:55 by Dr. Tony Larios MD.    XR Chest 1 View [[id number]] Collected: 05/25/23 1905     Updated: 05/25/23 1912    Narrative:      Frontal upright radiograph of the chest  5/25/2023 6:42 PM CDT     HISTORY: Status post intubation     COMPARISON: Chest x-ray dated 05/24/2023.     FINDINGS:      Endotracheal tube is in good position. Prominent cardiomegaly. Bilateral  interstitial coarsening and hazy perihilar airspace opacities suggesting  bilateral pulmonary edema. Bilateral trace layering pleural effusions.  No pneumothorax. Enteric tube courses below the diaphragm with tip  beyond the field of view.       Impression:      1. Endotracheal tube is in good position. Lungs well-expanded.  2. Cardiomegaly with bilateral pulmonary edema and layering trace  pleural effusions.        This report was finalized on 05/25/2023 19:09 by Dr Fco Cosby, .    XR Abdomen KUB [[id number]] Collected: 05/25/23 1901     Updated: 05/25/23 1905    Narrative:      XR ABDOMEN KUB- 5/25/2023 6:43 PM CDT     HISTORY: OG; A41.9-Sepsis, unspecified organism; R65.20-Severe sepsis  without septic shock; J96.00-Acute respiratory failure, unspecified  whether with hypoxia or hypercapnia; J18.9-Pneumonia, unspecified  organism; R77.8-Other specified abnormalities of plasma proteins;  R41.82-Altered mental status, unspecified; R13.11-Dysphagia, oral phase     FINDINGS:  Feeding tube is seen with the tip located in the upper  gastric body.     Moderate gastric distention. No gross free air in the upper abdomen.  Multiple air-filled small bowel loops in the upper and mid abdomen.       Impression:      Enteric tube tip located in the upper gastric body.     This report was finalized on 05/25/2023 19:02 by Dr Fco Cosby, .    US Venous Doppler Lower Extremity Bilateral (duplex) [[id number]] Collected: 05/25/23 1245     Updated: 05/25/23 1253    Narrative:      History: Swelling       Impression:      Impression: There is no evidence of deep venous thrombosis or  superficial thrombophlebitis of right or left lower extremities.     Comments: Bilateral lower extremity venous duplex exam was performed  using color  Doppler flow, Doppler waveform analysis, and grayscale  imaging, with and without compression. There is no evidence of deep  venous thrombosis in the common femoral, superficial femoral, popliteal,  peroneal, anterior tibial, and posterior tibial veins bilaterally. No  thrombus is identified in the saphenofemoral junctions and greater  saphenous veins bilaterally.         This report was finalized on 05/25/2023 12:45 by Dr. Jairo Martines MD.            I have reviewed the patient's current medications.       Assessment/Plan   Assessment  Active Hospital Problems    Diagnosis     **Sepsis with acute respiratory failure without septic shock, due to unspecified organism, unspecified whether hypoxia or hypercapnia present     Shock liver     OLMAN (acute kidney injury)     Moderate Malnutrition (HCC)     CAP (community acquired pneumonia)     Rhabdomyolysis     Nonrheumatic mitral valve insufficiency     Hypothyroidism (acquired)     Coronary artery disease involving native coronary artery of native heart with angina pectoris        Treatment Plan  Vitals per protocol  Sepsis appear to be improving  PNA/ARF > Rocephin/doxycycline  Follow cultures  Oxygen prn, goal room air  Invasive ventilatory support    D-Dimer and leg edema > US doppler negative  May need to consider CTA legs  Echocardiogram LVEF 20%, severe mitral regurgitation    OLMAN > anuria  Liver failure  Poor prognosis with multi end organ failure > Palliative care consult    DVT prophylaxis > Lovenox 40 mg SQ daily    Medical Decision Making  Number and Complexity of problems: 3 complex medical problems  Differential Diagnosis: None    Conditions and Status        Condition is improving.     [facility] Data  External documents reviewed: EHR  Cardiac tracing (EKG, telemetry) interpretation: sinus tachycardia  Radiology interpretation: See above  Labs reviewed: See above  Any tests that were considered but not ordered: None     Decision rules/scores evaluated  (example [id number], [place], etc): N/A     Discussed with: Patient     Care Planning  Shared decision making: Patient and POA  Code status and discussions: Full code    Disposition  Social Determinants of Health that impact treatment or disposition: None  I expect the patient to be discharged to home versus SNF in 2-4 days.     Electronically signed by John Denton MD, 05/26/23, 11:01 CDT.    Review of records:    Coronary artery disease (414.00) (I25.10)  ???NSTEMI in 9/14 with PTCA and stenting of the distal RCA using LACIE.PTCA and stenting  of the proximal RCA in 4/15, using LACIE. Preserved LVF.

## 2023-05-26 NOTE — PAYOR COMM NOTE
"5/26 CLINICAL  [id number]    [phone]      Driss Briggs (68 y.o. Female)       Date of Birth   1955    Social Security Number       Address   [address] [place] [address]    Home Phone   [phone]    MRN   [id number]       Mandaeism   Other    Marital Status   Single                            Admission Date   5/24/23    Admission Type   Emergency    Admitting Provider   John Denton MD    Attending Provider   John Denton MD    Department, Room/Bed   [facility] CARDIAC CARE, C003/1       Discharge Date       Discharge Disposition       Discharge Destination                                 Attending Provider: John Denton MD    Allergies: Aspirin    Isolation: None   Infection: None   Code Status: CPR    Ht: 162.6 cm (64\")   Wt: 53.3 kg (117 lb 6.4 oz)    Admission Cmt: None   Principal Problem: Sepsis with acute respiratory failure without septic shock, due to unspecified organism, unspecified whether hypoxia or hypercapnia present [A41.9,R65.20,J96.00]                   Active Insurance as of 5/24/2023       Primary Coverage       Payor Plan Insurance Group Employer/Plan Group    HUMANA MEDICARE REPLACEMENT HUMANA MEDICARE REPLACEMENT [id number]       Payor Plan Address Payor Plan Phone Number Payor Plan Fax Number Effective Dates    [address] [address] [phone]  1/1/2023 - None Entered    [facility] [id number]         Subscriber Name Subscriber Birth Date Member ID       DRISS BRIGGS 1955 [id number]                     Emergency Contacts        (Rel.) Home Phone Work Phone Mobile Phone    marco schaffer (Legal Guardian) -- -- [phone]              Current Facility-Administered Medications   Medication Dose Route Frequency Provider Last Rate Last Admin    albuterol (PROVENTIL) nebulizer solution 0.5% 2.5 mg/0.5mL  1.25 mg Nebulization 4x Daily - RT Alize Mckeon MD   1.25 mg at 05/26/23 1011    And    ipratropium " (ATROVENT) nebulizer solution 0.5 mg  0.5 mg Nebulization 4x Daily - RT Alize Mckeon MD   0.5 mg at 05/26/23 1011    sennosides-docusate (PERICOLACE) 8.6-50 MG per tablet 2 tablet  2 tablet Oral BID John Denton MD   2 tablet at 05/26/23 0817    And    polyethylene glycol (MIRALAX) packet 17 g  17 g Oral Daily PRN John Denton MD        And    bisacodyl (DULCOLAX) EC tablet 5 mg  5 mg Oral Daily PRN John Denton MD        And    bisacodyl (DULCOLAX) suppository 10 mg  10 mg Rectal Daily PRN John Denton MD        cefepime 2 gm IVPB in 100 ml NS (MBP)  2 g Intravenous Q12H Alize Mckeon MD   2 g at 05/26/23 0841    chlorhexidine (PERIDEX) 0.12 % solution 15 mL  15 mL Mouth/Throat Q12H John Denton MD   15 mL at 05/26/23 0858    Chlorhexidine Gluconate Cloth 2 % pads 1 application  1 application Topical Q24H John Denton MD   1 application at 05/26/23 0349    dextrose 5 % 850 mL with sodium bicarbonate 8.4 % 150 mEq infusion   Intravenous Continuous John Denton MD        DOPamine 400 mg in 250 mL D5W infusion  2-20 mcg/kg/min Intravenous Titrated Holger Mcknight DO 40 mL/hr at 05/26/23 0536 20 mcg/kg/min at 05/26/23 0536    Enoxaparin Sodium (LOVENOX) syringe 40 mg  40 mg Subcutaneous Q24H John Denton MD        famotidine (PEPCID) injection 20 mg  20 mg Intravenous BID John Denton MD   20 mg at 05/26/23 0817    levothyroxine (SYNTHROID, LEVOTHROID) tablet 100 mcg  100 mcg Oral Q AM John Denton MD   100 mcg at 05/26/23 0548    lidocaine (XYLOCAINE) 1 % injection 10 mL  10 mL Infiltration Once John Denton MD        mupirocin (BACTROBAN) 2 % nasal ointment 1 application  1 application Each Nare BID John Denton MD   1 application at 05/26/23 0817    nitroglycerin (NITROSTAT) SL tablet 0.4 mg  0.4 mg Sublingual Q5 Min PRN John Denton MD         norepinephrine (LEVOPHED) 8 mg in 250 mL NS infusion (premix)  0.02-0.3 mcg/kg/min Intravenous Titrated John Denton MD 24 mL/hr at 05/26/23 0945 0.24 mcg/kg/min at 05/26/23 0945    ondansetron (ZOFRAN) injection 4 mg  4 mg Intravenous Q6H PRN John Denton MD   4 mg at 05/24/23 1841    propofol (DIPRIVAN) infusion 10 mg/mL 100 mL  5-50 mcg/kg/min Intravenous Titrated John Denton MD   Stopped at 05/26/23 0539    sodium bicarbonate injection 8.4% 50 mEq  50 mEq Intravenous BID John Denton MD   50 mEq at 05/26/23 1104    sodium chloride 0.9 % flush 10 mL  10 mL Intravenous Q12H John Denton MD   10 mL at 05/26/23 0819    sodium chloride 0.9 % flush 10 mL  10 mL Intravenous PRN John Denton MD        sodium chloride 0.9 % infusion 40 mL  40 mL Intravenous PRN John Denton MD        vancomycin 750 mg/250 mL 0.9% NS IVPB ([facility])  750 mg Intravenous Q12H Alize Mckeon MD   750 mg at 05/26/23 0857     Orders (last 24 hrs)        Start     Ordered    05/27/23 0800  Vancomycin, Trough  Timed         05/25/23 2010 05/27/23 0600  Comprehensive Metabolic Panel  Morning Draw         05/26/23 1058    05/26/23 1800  Enoxaparin Sodium (LOVENOX) syringe 40 mg  Every 24 Hours         05/25/23 1843    05/26/23 1145  dextrose 5 % 850 mL with sodium bicarbonate 8.4 % 150 mEq infusion  Continuous         05/26/23 1057    05/26/23 1145  sodium bicarbonate injection 8.4% 50 mEq  2 Times Daily         05/26/23 1058    05/26/23 1130  lidocaine (XYLOCAINE) 1 % injection 10 mL  Once         05/26/23 1033    05/26/23 1109  POC Glucose Once  PROCEDURE ONCE         05/26/23 1056    05/26/23 1101  Inpatient Palliative Care Consult  Once        Comments: Sepsis and shock with new systolic congestive heart failure and liver/kidney failure   Provider:  (Not yet assigned)    05/26/23 1101    05/26/23 1056  Restraints Non-Violent or Non-Self Destructive  Calendar  Day         05/26/23 1055    05/26/23 1018  High Sensitivity Troponin T 2Hr  PROCEDURE ONCE         05/26/23 0909    05/26/23 1012  XR Chest 1 View  1 Time Imaging         05/26/23 1012    05/26/23 0946  POC Glucose Once  PROCEDURE ONCE         05/26/23 0933    05/26/23 0901  dextrose (D50W) 50 % (25 g/50 mL) IV injection  - ADS Override Pull        Note to Pharmacy: Created by cabinet override    05/26/23 0901    05/26/23 0845  cefepime 2 gm IVPB in 100 ml NS (MBP)  Every 12 Hours         05/25/23 1950    05/26/23 0836  Manual Differential  Once,   Status:  Canceled         05/26/23 0835    05/26/23 0826  Vascular Access Consult  Once        Provider:  (Not yet assigned)    05/26/23 0828    05/26/23 0825  Vascular Access Consult  Once        Provider:  (Not yet assigned)    05/26/23 0828    05/26/23 0659  High Sensitivity Troponin T  Morning Draw         05/25/23 1559    05/26/23 0659  CK  Morning Draw         05/25/23 1559    05/26/23 0659  CBC Auto Differential  PROCEDURE ONCE         05/25/23 2202    05/26/23 0602  Blood Gas, Arterial -  PROCEDURE ONCE         05/26/23 0601    05/26/23 0600  XR Chest 1 View  Daily       05/25/23 1843    05/26/23 0600  Blood Gas, Arterial -Obtain on: Current Settings  Daily      Comments: While on Vent      05/26/23 0341    05/26/23 0530  sodium chloride 0.9 % bolus 1,000 mL  Once         05/26/23 0440    05/26/23 0530  sodium bicarbonate injection 8.4% 50 mEq  Once         05/26/23 0441    05/26/23 0340  Ventilator - Vent Mode: AC/VC; FiO2: Titrate Per SpO2; Titrate Oxygen for SpO2: 92% or Greater; PEEP: 5  Continuous         05/26/23 0341    05/26/23 0000  BNP  Once,   Status:  Canceled         05/25/23 1559    05/26/23 0000  XR Chest 1 View  1 Time Imaging,   Status:  Canceled         05/25/23 1950    05/25/23 2241  Blood Gas, Arterial -  PROCEDURE ONCE         05/25/23 2240    05/25/23 2230  sodium bicarbonate injection 8.4% 50 mEq  Once         05/25/23 2143    05/25/23 2130   budesonide (PULMICORT) nebulizer solution 0.5 mg  2 Times Daily - RT,   Status:  Discontinued         05/25/23 1950 05/25/23 2104  CT Head Without Contrast  1 Time Imaging         05/25/23 2106 05/25/23 2100  chlorhexidine (PERIDEX) 0.12 % solution 15 mL  Every 12 Hours Scheduled         05/25/23 1843 05/25/23 2100  famotidine (PEPCID) injection 20 mg  2 Times Daily         05/25/23 1843 05/25/23 2100  sodium chloride 0.9 % bolus 500 mL  Once         05/25/23 2005 05/25/23 2100  DOPamine 400 mg in 250 mL D5W infusion  Titrated         05/25/23 2005 05/25/23 2100  vancomycin 750 mg/250 mL 0.9% NS IVPB ([facility])  Every 12 Hours         05/25/23 2006 05/25/23 2045  cefepime 2 gm IVPB in 100 ml NS (MBP)  Once         05/25/23 1950 05/25/23 2045  vancomycin (VANCOCIN) 1,000 mg in sodium chloride 0.9 % 250 mL IVPB-VTB  Every 12 Hours,   Status:  Discontinued         05/25/23 1950 05/25/23 2045  albuterol (PROVENTIL) nebulizer solution 0.5% 2.5 mg/0.5mL  4 Times Daily - RT        See Hyperspace for full Linked Orders Report.    05/25/23 1950 05/25/23 2045  ipratropium (ATROVENT) nebulizer solution 0.5 mg  4 Times Daily - RT        See Hyperspace for full Linked Orders Report.    05/25/23 1950 05/25/23 2037  Restraints Non-Violent or Non-Self Destructive  Calendar Day,   Status:  Canceled         05/25/23 2037 05/25/23 2000  Oral Care & Teeth Brushing - Intubated Patient  Every 4 Hours      Comments: [place] Teeth at Least 2x/day    05/25/23 1843 05/25/23 2000  Post Extubation: Begin Incentive Spirometry Every 2 Hours While Awake  Every 2 Hours While Awake       05/25/23 1843 05/25/23 1953  MRSA Screen, PCR (Inpatient) - Swab, Nares  Once         05/25/23 1952 05/25/23 1952  Legionella Antigen, Urine - Urine, Urine, Clean Catch  Once         05/25/23 1952 05/25/23 1952  S. Pneumo Ag Urine or CSF - Urine, Urine, Clean Catch  Once         05/25/23 1952 05/25/23 1915   norepinephrine (LEVOPHED) 8 mg in 250 mL NS infusion (premix)  Titrated         05/25/23 1816    05/25/23 1904  POC Glucose Once  PROCEDURE ONCE         05/25/23 1852    05/25/23 1900  propofol (DIPRIVAN) infusion 10 mg/mL 100 mL  Titrated         05/25/23 1811    05/25/23 1858  Magnesium  STAT         05/25/23 1857    05/25/23 1858  Phosphorus  STAT         05/25/23 1857    05/25/23 1843  Elevate HOB  Continuous         05/25/23 1843    05/25/23 1843  Subglottic Suctioning Must Be Done Every 6 Hours  Per Order Details        Comments: At Least Every 6 Hours    05/25/23 1843    05/25/23 1843  NPO Diet NPO Type: Strict NPO  Diet Effective Now         05/25/23 1843    05/25/23 1843  Inpatient Consult to Nutrition Services  Once        Provider:  (Not yet assigned)    05/25/23 1843    05/25/23 1843  If Patient Shows Signs of Increased Work of Breathing as Evidenced by a Respiratory Rate Greater than 35 or Respiratory Distress, Notify Provider  Until Discontinued         05/25/23 1843    05/25/23 1843  Blood Gas, Arterial -Obtain on: Current Settings  Once        Comments: One Hour After Intubation      05/25/23 1843    05/25/23 1843  RN May Place Order For ABG As Needed for Respiratory Distress  Continuous         05/25/23 1843    05/25/23 1843  Intubate  Once         05/25/23 1843    05/25/23 1842  ECG 12 Lead Rhythm Change  Once         05/25/23 1841    05/25/23 1840  Intubation  Once        Comments: This order was created via procedure documentation    05/25/23 1839    05/25/23 1839  Insert Central Line At Bedside  Once        Comments: This order was created via procedure documentation    05/25/23 1838    05/25/23 1834  Blood Gas, Venous -  PROCEDURE ONCE         05/25/23 1830 05/25/23 1814  Inpatient Pulmonology Consult  Once        Specialty:  Pulmonary Disease  Provider:  Alize Mckeon MD    05/25/23 1814 05/25/23 1814  etomidate (AMIDATE) injection  Code / Trauma / Sedation Medication          23 1834    23 1812  Restraints Non-Violent or Non-Self Destructive  Calendar Day,   Status:  Canceled         23 1811    23 1812  XR Abdomen KUB  1 Time Imaging         23 1812    23 1811  XR Chest 1 View  1 Time Imaging         23 1810    23 1806  Blood Gas, Arterial -  STAT         23 1805    23 1800  Dietary Nutrition Supplements Boost Plus (Ensure Enlive, Ensure Plus)  Daily With Breakfast & Dinner,   Status:  Canceled       23 1208    23 1800  Enoxaparin Sodium (LOVENOX) syringe 50 mg  Every 12 Hours,   Status:  Discontinued         23 1350    23 1644  Insert Indwelling Urinary Catheter  Once        Comments: Follow Protocol As Outlined in Process Instructions (Open Order Report to View Full Instructions)   See Hyperspace for full Linked Orders Report.    23 1644    23 1644  Assess Need for Indwelling Urinary Catheter - Follow Removal Protocol  Continuous        Comments: Follow Protocol As Outlined in Process Instructions (Open Order Report to View Full Instructions)   See Hyperspace for full Linked Orders Report.    23 1644    23 1644  Urinary Catheter Care  Every Shift      See Hyperspace for full Linked Orders Report.    23 1644    23 1225  SLP Plan of Care Cert / Re-Cert  Once        Comments: Speech Language Pathology Plan of Care  Initial Certification  Certification Period: 2023 - 2023    Patient Name: Nati Nguyen  : 1955    (A41.9,  R65.20,  J96.00) Sepsis with acute respiratory failure without septic shock, due to unspecified organism, unspecified whether hypoxia or hypercapnia present  (primary encounter diagnosis)  (J18.9) Pneumonia due to infectious organism, unspecified laterality, unspecified part of lung  (R77.8) Elevated troponin  (R41.82) Altered mental status, unspecified altered mental status type  (R13.11) Oral phase dysphagia                Assessment  SLP  Assessment  Prior Level of Function-Communication: unknown  Prior Level of Function-Swallowing: unknown  SLP Swallowing Diagnosis: mild, oral dysphagia, R/O pharyngeal dysphagia  Plan of Care Reviewed With: patient, caregiver  Swallow Criteria for Skilled Therapeutic Interventions Met: demonstrates skilled criteria  Therapy Frequency (Swallow): PRN  Predicted Duration Therapy Intervention (Days): until discharge       IP SLP Goals     Row Name 05/25/23 1029             (LTG) Swallow    (LTG) Swallow Patient will tolerate least restrictive diet without s/s of   aspiration.  -MG      [place] (Swallow Long Term Goal) independently (over 90% accuracy)    -MG      Time Frame (Swallow Long Term Goal) by discharge  -MG      Barriers (Swallow Long Term Goal) new  -MG      Progress/Outcomes (Swallow Long Term Goal) new goal  -MG         (STG) Patient will tolerate trials of    Consistencies Trialed (Tolerate trials) pureed textures;soft to chew   (ground) textures;thin liquids  -MG      Desired Outcome (Tolerate trials) without signs/symptoms of   aspiration;without signs of distress;with adequate oral   prep/transit/clearance  -MG      [place] (Tolerate trials) independently (over 90% accuracy)  -MG      Time Frame (Tolerate trials) by discharge  -MG      Progress/Outcomes (Tolerate trials) new goal  -MG            User Key  (r) = Recorded By, (t) = Taken By, (c) = Cosigned By    Initials Name Provider Type    Sosa Sargent MS CCC-SLP Speech and Language Pathologist                        Plan    SLP Plan  Therapy Frequency (Swallow): PRN  Predicted Duration Therapy Intervention (Days): until discharge        Sosa Goodrich MS CCC-SLP  5/25/2023      By cosigning this order, either electronically or physically, I certify that the therapy services are furnished while this patient is under my care, the services outline above are required by this patient, and will be reviewed every 90  days.        M.D.:__________________________________________ Date: ______________    05/25/23 1224    05/25/23 1209  Dietary Nutrition Supplements Magic Cup  Daily With Lunch,   Status:  Canceled       05/25/23 1208    05/25/23 1207  Blood Culture - Blood, Hand, Right  Once         05/25/23 1206    05/25/23 0600  Basic Metabolic Panel  Daily       05/24/23 1540    05/25/23 0600  CBC & Differential  Daily       05/24/23 1540    05/25/23 0400  Chlorhexidine Gluconate Cloth 2 % pads 1 application  Every 24 Hours         05/24/23 1540    05/24/23 2100  sodium chloride 0.9 % flush 10 mL  Every 12 Hours Scheduled         05/24/23 1540    05/24/23 2100  sennosides-docusate (PERICOLACE) 8.6-50 MG per tablet 2 tablet  2 Times Daily        [facility] for [facility] Orders Report.    05/24/23 1540    05/24/23 1900  albuterol (PROVENTIL) nebulizer solution 0.083% 2.5 mg/3mL  Every 6 Hours - RT,   Status:  Discontinued         05/24/23 1540    05/24/23 1830  levothyroxine (SYNTHROID, LEVOTHROID) tablet 100 mcg  Every Early Morning         05/24/23 1738    05/24/23 1800  azithromycin (ZITHROMAX) 500 mg in sodium chloride 0.9 % 250 mL IVPB-VTB  Every 24 Hours,   Status:  Discontinued         05/24/23 1536    05/24/23 1800  Enoxaparin Sodium (LOVENOX) syringe 60 mg  Every 12 Hours,   Status:  Discontinued         05/24/23 1538    05/24/23 1700  cefTRIAXone (ROCEPHIN) 2 g in sodium chloride 0.9 % 100 mL IVPB  Every 24 Hours,   Status:  Discontinued         05/24/23 1536    05/24/23 1630  sodium chloride 0.9 % infusion  Continuous,   Status:  Discontinued         05/24/23 1536    05/24/23 1630  mupirocin (BACTROBAN) 2 % nasal ointment 1 application  2 Times Daily         05/24/23 1540    05/24/23 1600  Vital Signs Every Hour and Per Hospital Policy Based on Patient Condition  Every Hour       05/24/23 1540    05/24/23 1600  Intake & Output  Every Hour       05/24/23 1540    05/24/23 1541  Daily Weights  Daily       05/24/23 1540     05/24/23 1540  ondansetron (ZOFRAN) injection 4 mg  Every 6 Hours PRN         05/24/23 1540    05/24/23 1540  albuterol (PROVENTIL) nebulizer solution 0.083% 2.5 mg/3mL  Every 6 Hours PRN,   Status:  Discontinued         05/24/23 1540    05/24/23 1540  Oral Care - Patient Not on NPPV & Not Intubated  Every Shift       05/24/23 1540    05/24/23 1539  nitroglycerin (NITROSTAT) SL tablet 0.4 mg  Every 5 Minutes PRN         05/24/23 1540    05/24/23 1539  sodium chloride 0.9 % flush 10 mL  As Needed         05/24/23 1540    05/24/23 1539  sodium chloride 0.9 % infusion 40 mL  As Needed         05/24/23 1540    05/24/23 1539  polyethylene glycol (MIRALAX) packet 17 g  Daily PRN        See Hyperspace for full Linked Orders Report.    05/24/23 1540    05/24/23 1539  bisacodyl (DULCOLAX) EC tablet 5 mg  Daily PRN        See Hyperspace for full Linked Orders Report.    05/24/23 1540    05/24/23 1539  bisacodyl (DULCOLAX) suppository 10 mg  Daily PRN        See Hyperspace for full Linked Orders Report.    05/24/23 1540    05/24/23 1049  sodium chloride 0.9 % flush 10 mL  As Needed,   Status:  Discontinued        See Hyperspace for full Linked Orders Report.    05/24/23 1050    Unscheduled  Spontaneous Awakening Trial  Daily - SAT       05/25/23 1843    Unscheduled  Spontaneous Breathing Trial  Daily - SBT       05/25/23 1843    Unscheduled  Spontaneous Awakening Trial  Daily - SAT       05/25/23 1843    Unscheduled  Spontaneous Breathing Trial  Daily - SBT       05/25/23 1843    Unscheduled  Oxygen Therapy- Nasal Cannula; Titrate 1-6 LPM Per SpO2; 92% or Greater  Continuous PRN       05/25/23 1843    Unscheduled  If Stridor is Noted, Initiate Cool Mist Aerosol Mask and Notify the Provider for Additional Orders  As Needed       05/25/23 1843    --  amitriptyline (ELAVIL) 50 MG tablet  Nightly         05/24/23 1734    --  atorvastatin (LIPITOR) 20 MG tablet  Nightly         05/24/23 1734    --  pantoprazole (PROTONIX) 40 MG  EC tablet  Daily         05/24/23 1734    --  ipratropium-albuterol (DUO-NEB) 0.5-2.5 mg/3 ml nebulizer  Every 6 Hours - RT         05/24/23 1734    --  levothyroxine (SYNTHROID, LEVOTHROID) 100 MCG tablet  Daily         05/24/23 1734    --  gabapentin (NEURONTIN) 300 MG capsule  3 Times Daily         05/24/23 1734    --  methocarbamol (ROBAXIN) 500 MG tablet  2 Times Daily         05/24/23 1734    --  escitalopram (LEXAPRO) 10 MG tablet  Daily         05/25/23 0959    --  meclizine (ANTIVERT) 25 MG tablet  2 Times Daily         05/25/23 0959    --  nitroglycerin (NITROSTAT) 0.4 MG SL tablet  Every 5 Minutes PRN         05/25/23 0959    --  SCANNED EKG         05/24/23 0000    Pending  CT Head Without Contrast  1 Time Imaging         Pending                  Ventilator/Non-Invasive Ventilation Settings (From admission, onward)       Start     Ordered    05/26/23 0340  Ventilator - Vent Mode: AC/VC; FiO2: Titrate Per SpO2; Titrate Oxygen for SpO2: 92% or Greater; PEEP: 5  Continuous        Question Answer Comment   Vent Mode AC/VC    FiO2 Titrate Per SpO2    Titrate Oxygen for SpO2 92% or Greater    PEEP 5        05/26/23 0341                     Physician Progress Notes (last 24 hours)        Deneen Rosas APRN at 05/26/23 0707              [facility] [place] [place] - [facility]    Patient: Nati Nguyen    1955    MR# [id number]    Acct# [id number]  05/26/23   07:07 CDT  Referring Provider: John Denton,*    Chief Complaint: Mechanically ventilated    Interval history: She remains mechanically ventilated. Tmax is 99.3. Blood culture positive for Bacillus species. WBC increased to 31.48, platelets decreased to 136, Hgb 9.5, Mg 2.4, Phosphorus 4.2, Na 133. RT had difficulty obtaining arterial blood gas last night. Dr. Mcknight was unsuccessful in placing an Art line this am.  ABG this am with pH of 7.131, pCO2 34.9, pO2 90.6, Hco3 11.6. This was felt to be venous  and arterial. CXR reviewed. She is on 1.0 FIO2 with sat of 90-92% and ET of 13. Plateau of 17. She is on Propofol but was paused secondary to low BP. RN notes she does not follow commands but does withdraw from pain. She is on Levophed and Dopamine at max doses. BP is 94/50 with MAP of 65. RN states MAP has been around 61. She has had two amps of Bicarb. She has IVF at 50 ml/hr. Review of records appears she has had several Fluid bolus over the last few days. RN states she has been slowly weaning the Levophed. She has been tachycardic. She has no UOP since midnight. Will advance the ETT 1-2 cm.     Meds:  albuterol, 1.25 mg, Nebulization, 4x Daily - RT   And  ipratropium, 0.5 mg, Nebulization, 4x Daily - RT  cefepime, 2 g, Intravenous, Q12H  chlorhexidine, 15 mL, Mouth/Throat, Q12H  Chlorhexidine Gluconate Cloth, 1 application, Topical, Q24H  enoxaparin, 40 mg, Subcutaneous, Q24H  famotidine, 20 mg, Intravenous, BID  levothyroxine, 100 mcg, Oral, Q AM  mupirocin, 1 application, Each Nare, BID  senna-docusate sodium, 2 tablet, Oral, BID  sodium chloride, 10 mL, Intravenous, Q12H  vancomycin, 750 mg, Intravenous, Q12H      DOPamine, 2-20 mcg/kg/min, Last Rate: 20 mcg/kg/min (05/26/23 0536)  norepinephrine, 0.02-0.3 mcg/kg/min, Last Rate: 0.3 mcg/kg/min (05/26/23 0206)  propofol, 5-50 mcg/kg/min, Last Rate: Stopped (05/26/23 0539)  sodium chloride, 50 mL/hr, Last Rate: 50 mL/hr (05/26/23 0624)        Ventilator Settings:        Resp Rate (Set): 28  Pressure Support (cm H2O): 10 cm H20  FiO2 (%): 100 %  PEEP/CPAP (cm H2O): 5 cm H20  Minute Ventilation (L/min) (Obs): 15.2 L/min  Resp Rate (Observed) Vent: 31     I:E Ratio (Obs): 1:2  PIP Observed (cm H2O): 35 cm H2O     Physical Exam:  Temp:  [96.1 °F (35.6 °C)-99.3 °F (37.4 °C)] 99.3 °F (37.4 °C)  Heart Rate:  [] 96  Resp:  [11-30] 30  BP: ()/(15-94) 108/47  FiO2 (%):  [100 %] 100 %  Intake/Output Summary (Last 24 hours) at 5/26/2023 0707  Last data filed at  5/26/2023 0400  Gross per 24 hour   Intake 1201.66 ml   Output 750 ml   Net 451.66 ml     SpO2 Percentage    05/26/23 0615 05/26/23 0630 05/26/23 0645   SpO2: 100% 100% 100%   Body mass index is 20.15 kg/m².   Physical Exam  Vitals and nursing note reviewed.   Constitutional:       General: She is not in acute distress.     Appearance: She is ill-appearing and toxic-appearing. She is not diaphoretic.   HENT:      Head: Normocephalic and atraumatic.   Eyes:      General: No scleral icterus.        Right eye: No discharge.         Left eye: No discharge.   Cardiovascular:      Rate and Rhythm: Regular rhythm. Tachycardia present.      Pulses: Normal pulses.      Heart sounds: Normal heart sounds. No murmur heard.    No friction rub. No gallop.   Pulmonary:      Effort: No respiratory distress.      Breath sounds: No stridor. Rales present. No wheezing.      Comments: Decreased breath sound bilaterally.  Abdominal:      General: Abdomen is flat. Bowel sounds are normal. There is no distension.   Genitourinary:     Comments: Not examined, Singh Cath  Musculoskeletal:         General: No swelling, tenderness or deformity. Normal range of motion.      Cervical back: Normal range of motion and neck supple. No rigidity or tenderness.      Right lower leg: Edema present.      Left lower leg: Edema present.   Skin:     General: Skin is warm and dry.      Capillary Refill: Capillary refill takes less than 2 seconds.      Coloration: Skin is pale. Skin is not jaundiced. Mottling of feet.      Findings: Bruising present.   Neurological:    Sedation is paused, does not follow commands but does shrug shoulders when asked to squeeze hands. No blink reflex.   Psychiatric:      Comments: Could not be assessed.   Electronically signed by ISHAN Ortega, 5/26/2023, 07:07 CDT       Electronically signed by Deneen Rosas APRN at 05/26/23 0824          Consult Notes (last 24 hours)        Alize Mckeon MD at  05/25/23 1926        Consult Orders    1. Inpatient Pulmonology Consult [[id number]] ordered by John Denton MD at 05/25/23 1814                      [facility] [place] PULMONARY AND CRITICAL CARE CONSULT - [facility]    Nati Nguyen   MR# [id number]  Acct# [id number]  5/25/2023   19:26 CDT    Referring Provider: John Denton,*    Chief Complaint: Mechanically ventilated    HPI: We are consulted by John Denton,* to see this 68 y.o. female currently receiving mechanical ventilation for sepsis, septic shock and hypotension and she was unable to control her airway.    Patient was seen in the cardiac care unit as a pulmonary consult today.  She was unable to provide any history because she was already intubated.  From reviewing the chart records it appears patient has known history of hypertension, hypothyroidism and chronic obstructive pulmonary disease with prior history of tobacco abuse.  She was at her home and family could not reach her for almost a week.  Her power of  requested for a well care visit and patient was noted to be disoriented with shortness of breath and was taken to the emergency department.  She was noted to have high proBNP elevated glucose and low serum sodium with elevated liver enzymes on arrival.  He also was noted to have cardiomegaly and bilateral pulmonary infiltrate with left lower lobe infiltrate suggesting pneumonia with layering pleural effusions.  Her D-dimer was significantly elevated.  She also has significant leukocytosis.  Arterial blood gas did not show much hypoxia.  She was placed on high flow oxygen initially but later needed intubation.    Patient was admitted as an inpatient specialist team and was given fluid bolus.  She was getting IV fluid saline 75 cc an hour but it was decreased to 50 cc an hour.  She was noted to have bilateral ankle edema.  Hypotensive and also needed Levophed for hypotension.  This afternoon patient has  significant drop in blood pressure and became unresponsive.  She was initially started on BiPAP switched from regular oxygen.  Due to rapidly deteriorating clinical status she was intubated at the bedside and a central line was placed by Dr. Kilpatrick.  Patient was started on mechanical ventilation and pulmonary consult was requested.  Medical records it appears patient is a smoker and was smoking at home and was on DuoNeb nebulizer I did not see any long-term inhalers and I was not sure whether patient was on oxygen or not.    At time of my visit patient was stable on assist-control volume control mechanical ventilation tidal volume 440 rate of 20 PEEP of 5 and FiO2 100% which was decreased to 80% oxygen saturation around 100% he was getting started on propofol drip and was waking up.  Further history could be obtained from the patient.  Her chest x-ray showed cardiomegaly and bilateral pulm input in the left lower lobe infiltrate.    Past Medical History   has no past medical history on file.   has no past surgical history on file.  Allergies   Allergen Reactions    Aspirin Unknown - Low Severity     Medications  albuterol, 2.5 mg, Nebulization, Q6H - RT  azithromycin, 500 mg, Intravenous, Q24H  cefTRIAXone, 2 g, Intravenous, Q24H  chlorhexidine, 15 mL, Mouth/Throat, Q12H  Chlorhexidine Gluconate Cloth, 1 application, Topical, Q24H  [START ON 5/26/2023] enoxaparin, 40 mg, Subcutaneous, Q24H  famotidine, 20 mg, Intravenous, BID  levothyroxine, 100 mcg, Oral, Q AM  mupirocin, 1 application, Each Nare, BID  senna-docusate sodium, 2 tablet, Oral, BID  sodium chloride, 10 mL, Intravenous, Q12H      norepinephrine, 0.02-0.3 mcg/kg/min, Last Rate: 0.3 mcg/kg/min (05/25/23 1829)  propofol, 5-50 mcg/kg/min, Last Rate: Stopped (05/25/23 1830)  sodium chloride, 50 mL/hr, Last Rate: 50 mL/hr (05/25/23 1556)      Social History   reports that she has been smoking cigarettes. She does not have any smokeless tobacco history on  file. She reports that she does not currently use alcohol. She reports that she does not use drugs.  Family History  family history is not on file.  Physical Exam:  Temp:  [98 °F (36.7 °C)-98.6 °F (37 °C)] 98 °F (36.7 °C)  Heart Rate:  [87-98] 92  Resp:  [11-20] 20  BP: ()/(36-75) 74/48  FiO2 (%):  [100 %] 100 %  Intake/Output Summary (Last 24 hours) at 5/25/2023 1926  Last data filed at 5/25/2023 1638  Gross per 24 hour   Intake 2126.45 ml   Output 1700 ml   Net 426.45 ml         05/24/23  1506 05/25/23  0400   Weight: 52.8 kg (116 lb 4.8 oz) 53.3 kg (117 lb 6.4 oz)     SpO2 Percentage    05/25/23 1611 05/25/23 1655 05/25/23 1825   SpO2: 99% 100% (!) 72%     Body mass index is 20.15 kg/m².  Vent Settings     Patient is intubated with 7.5 endotracheal tube, tidal volume 440, rate 20, PEEP of 5, FiO2 100% with oxygen saturation 100%     Resp Rate (Set): 20  Pressure Support (cm H2O): 10 cm H20  FiO2 (%): 100 %  PEEP/CPAP (cm H2O): 5 cm H20  Minute Ventilation (L/min) (Obs): 8.46 L/min  Resp Rate (Observed) Vent: 21     I:E Ratio (Obs): 1:2.7  PIP Observed (cm H2O): 16 cm H2O  Plateau Pressure (cm H2O): 24 cm H2O      Physical Exam  Vitals and nursing note reviewed.   Constitutional:       General: She is not in acute distress.     Appearance: She is ill-appearing and toxic-appearing. She is not diaphoretic.      Comments: Middle-aged  female looks tired and exhausted orally intubated on mechanical ventilation   HENT:      Head: Normocephalic and atraumatic.      Right Ear: Tympanic membrane and external ear normal. There is no impacted cerumen.      Left Ear: Tympanic membrane and external ear normal. There is no impacted cerumen.      Nose: Nose normal. No congestion or rhinorrhea.      Mouth/Throat:      Mouth: Mucous membranes are moist.      Pharynx: Oropharynx is clear. No oropharyngeal exudate or posterior oropharyngeal erythema.   Eyes:      General: No scleral icterus.        Right eye: No  discharge.         Left eye: No discharge.      Extraocular Movements: Extraocular movements intact.      Pupils: Pupils are equal, round, and reactive to light.   Neck:      Vascular: No carotid bruit.   Cardiovascular:      Rate and Rhythm: Regular rhythm. Tachycardia present.      Pulses: Normal pulses.      Heart sounds: Normal heart sounds. No murmur heard.    No friction rub. No gallop.   Pulmonary:      Effort: No respiratory distress.      Breath sounds: No stridor. Rales present. No wheezing.      Comments: Decreased breath sound bilaterally.  Chest:      Chest wall: No tenderness.   Abdominal:      General: Abdomen is flat. Bowel sounds are normal. There is no distension.      Palpations: There is no mass.      Tenderness: There is no abdominal tenderness. There is no right CVA tenderness, left CVA tenderness or guarding.   Genitourinary:     Comments: Not examined  Musculoskeletal:         General: No swelling, tenderness or deformity. Normal range of motion.      Cervical back: Normal range of motion and neck supple. No rigidity or tenderness.      Right lower leg: Edema present.      Left lower leg: Edema present.   Lymphadenopathy:      Cervical: No cervical adenopathy.   Skin:     General: Skin is warm and dry.      Capillary Refill: Capillary refill takes less than 2 seconds.      Coloration: Skin is pale. Skin is not jaundiced.      Findings: Bruising present. No lesion or rash.   Neurological:      General: No focal deficit present.      Mental Status: Mental status is at baseline.      Cranial Nerves: No cranial nerve deficit.      Sensory: No sensory deficit.      Motor: Weakness present.      Deep Tendon Reflexes: Reflexes normal.      Comments: Could not be assessed   Psychiatric:      Comments: Could not be assessed.     Result Review  Results from last 7 days   Lab Units 05/25/23  0633 05/24/23  1024   WBC 10*3/mm3 31.48* 28.60*   HEMOGLOBIN g/dL 9.5* 8.7*   PLATELETS 10*3/mm3 136* 167      Results from last 7 days   Lab Units 05/25/23  0832 05/24/23  1024   SODIUM mmol/L 133* 131*   POTASSIUM mmol/L 4.8 5.1   CO2 mmol/L 18.0* 9.0*   BUN mg/dL 52* 46*   CREATININE mg/dL 0.77 0.92   MAGNESIUM mg/dL  --  2.3   GLUCOSE mg/dL 116* 130*     Results from last 7 days   Lab Units 05/25/23  1830 05/24/23  1601 05/24/23  1119   PH, ARTERIAL pH units  --  7.384 7.152*   PCO2, ARTERIAL mm Hg  --  28.8* 28.0*   PO2 ART mm Hg  --  143.0* 72.2*   FIO2 % 100 80 100     Microbiology Results (last 10 days)       Procedure Component Value - Date/Time    Blood Culture - Blood, Arm, Left [[id number]]  (Abnormal) Collected: 05/24/23 1100    Lab Status: Preliminary result Specimen: Blood from Arm, Left Updated: 05/25/23 1138     Blood Culture Abnormal Stain     Gram Stain Aerobic Bottle Gram positive bacilli    Narrative:      Blood culture does not meet the specified criteria for PCR identification.  If pregnant, immunocompromised, or clinical concern for meningitis, call lab to run BCID for Listeria monocytogenes.    Blood Culture - Blood, Arm, Right [[id number]]  (Normal) Collected: 05/24/23 1100    Lab Status: Preliminary result Specimen: Blood from Arm, Right Updated: 05/25/23 1116     Blood Culture No growth at 24 hours          Lab Results   Component Value Date    PROBNP 12,071.0 (H) 05/24/2023     Recent radiology:   Imaging Results (Last 72 Hours)       Procedure Component Value Units Date/Time    XR Chest 1 View [[id number]] Collected: 05/25/23 1905     Updated: 05/25/23 1912    Narrative:      Frontal upright radiograph of the chest 5/25/2023 6:42 PM CDT     HISTORY: Status post intubation     COMPARISON: Chest x-ray dated 05/24/2023.     FINDINGS:      Endotracheal tube is in good position. Prominent cardiomegaly. Bilateral  interstitial coarsening and hazy perihilar airspace opacities suggesting  bilateral pulmonary edema. Bilateral trace layering pleural effusions.  No pneumothorax. Enteric tube courses below  the diaphragm with tip  beyond the field of view.       Impression:      1. Endotracheal tube is in good position. Lungs well-expanded.  2. Cardiomegaly with bilateral pulmonary edema and layering trace  pleural effusions.        This report was finalized on 05/25/2023 19:09 by Dr Fco Cosby, .    XR Abdomen KUB [[id number]] Collected: 05/25/23 1901     Updated: 05/25/23 1905    Narrative:      XR ABDOMEN KUB- 5/25/2023 6:43 PM CDT     HISTORY: OG; A41.9-Sepsis, unspecified organism; R65.20-Severe sepsis  without septic shock; J96.00-Acute respiratory failure, unspecified  whether with hypoxia or hypercapnia; J18.9-Pneumonia, unspecified  organism; R77.8-Other specified abnormalities of plasma proteins;  R41.82-Altered mental status, unspecified; R13.11-Dysphagia, oral phase     FINDINGS:  Feeding tube is seen with the tip located in the upper  gastric body.     Moderate gastric distention. No gross free air in the upper abdomen.  Multiple air-filled small bowel loops in the upper and mid abdomen.       Impression:      Enteric tube tip located in the upper gastric body.     This report was finalized on 05/25/2023 19:02 by Dr Fco Cosby, .    US Venous Doppler Lower Extremity Bilateral (duplex) [[id number]] Collected: 05/25/23 1245     Updated: 05/25/23 1253    Narrative:      History: Swelling       Impression:      Impression: There is no evidence of deep venous thrombosis or  superficial thrombophlebitis of right or left lower extremities.     Comments: Bilateral lower extremity venous duplex exam was performed  using color Doppler flow, Doppler waveform analysis, and grayscale  imaging, with and without compression. There is no evidence of deep  venous thrombosis in the common femoral, superficial femoral, popliteal,  peroneal, anterior tibial, and posterior tibial veins bilaterally. No  thrombus is identified in the saphenofemoral junctions and greater  saphenous veins bilaterally.         This report was  finalized on 05/25/2023 12:45 by Dr. Jairo Martines MD.    CT Abdomen Pelvis With Contrast [[id number]] Collected: 05/24/23 1332     Updated: 05/24/23 1356    Narrative:      EXAMINATION: CT ABDOMEN PELVIS W CONTRAST-      5/24/2023 12:52 PM CDT     HISTORY: Sepsis. Abdominal pain. Chest pain. Shortness of breath.     In order to have a CT radiation dose as low as reasonably achievable  Automated Exposure Control was utilized for adjustment of the mA and/or  KV according to patient size.     DLP in mGycm= 371     The CT scan of the abdomen and pelvis is performed after intravenous  contrast enhancement.     Images are acquired in axial plane and subsequent reconstruction in  coronal and sagittal planes.     There is no previous similar study for comparison.     There are motion artifacts which are noted on diagnostic quality of the  study.     The correlation made with chest radiograph obtained earlier today.     The chest is separately reviewed and reported in CT scan of the chest  which is to follow.     There is severe fatty infiltration of the liver. There is moderate  hepatomegaly. The spleen is normal.     The gallbladder is surgically absent.     Pancreas is normal.     There are hyper enhancing but normal shape adrenal glands bilaterally.     Moderate lobulation and renal contour bilaterally seen. No discrete  mass. There is a symmetrical nephrogram. No calculi. No hydronephrosis.  The ureters are not optimally visualized or evaluated. The urinary  bladder is significantly distended. No intrinsic abnormality.     A small uterus is seen. No adnexal masses. There is significantly  dilated and tortuous pelvic veins around the uterus and ovaries and  significantly enlarged 18 veins bilaterally.     Moderately distended thick-walled stomach is seen with significant  enhancement of the stomach wall. The stomach is full of fluid. There is  diffuse thickening and enhancement of the small bowel wall.  No  significant dilatation except for the distal ileum which is mildly  dilated with fluid. The appendix is not clearly visualized. Moderate gas  and stool is seen in the colon. Moderate thickening of the wall of the  entire colon is seen. No obstruction.     Severe atheromatous changes of the abdominal aorta iliac and femoral  arteries. Atheromatous plaques are seen at the origin of the celiac and  superior mesenteric artery. There is significant focal stenosis of the  proximal celiac trunk. Large atheromatous plaques are seen at the origin  of both renal arteries, right more than the left. Significant stenosis  of the proximal right renal artery.     There is no evidence of abdominal lymphadenopathy.     There is small amount of fluid in the abdomen and pelvis.     There is diffuse infiltration of subcutaneous fat with fluid  accumulation in the abdominal wall more pronounced inferiorly and  laterally and posteriorly. This may represent a fluid overload/anasarca.     Images reviewed in bone window show chronic degenerative changes of the  lower lumbar spine. No focal bony abnormality or a bone lesion.       Impression:      1. The hyper enhancing adrenal glands, thickened and enhanced stomach  wall, wall/mucosa of small and large bowel represent a CT hypoperfusion  complex which may be due to hypotension or a septic shock.  2. Severe hepatic steatosis.  3. Severe atheromatous changes of the abdominal aorta and iliac  arteries. Significant stenosis of the proximal celiac and right renal  artery.  4. Small amount of abdominal and pelvic fluid, abdominal wall  edema/fluid accumulation may represent fluid overload/anasarca.  5. Thickening of the small and large bowel may also be inflammatory or  ischemic process.  6. Significantly dilated and tortuous utero-ovarian veins and  significant large and distended ovale/the gallbladder planes may  represent pelvic congestion syndrome and or a component of CT  hypoperfusion  complex.                             This report was finalized on 05/24/2023 13:53 by Dr. Abril Ribera MD.    CT Angiogram Chest [[id number]] Collected: 05/24/23 1338     Updated: 05/24/23 1355    Narrative:      EXAM/TECHNIQUE: CT chest angiography with 3D MIP images with IV contrast     INDICATION: Shortness of breath     COMPARISON: None     DLP: 162 mGy cm. Automated exposure control was also utilized to  decrease patient radiation dose.     FINDINGS:     No evidence of pulmonary embolus. Main pulmonary artery is nondilated.  Thoracic aorta is nonaneurysmal and contains heavy atherosclerotic  calcification and plaque. 4 chamber cardiomegaly with hepatic venous  reflux of IV contrast. The hepatic veins are dilated. No pericardial  effusion. Mild coronary artery atherosclerotic calcification     Central airways are clear. Small to moderate bilateral pleural effusions  with overlying atelectasis. Mild bilateral interlobular septal  thickening with faint groundglass opacity. Severe emphysema. No enlarged  thoracic lymph nodes. 4 mm LEFT upper lobe pulmonary nodule on image 54.     No acute chest wall soft tissue abnormality. Small perihepatic ascites.  No acute osseous finding.       Impression:         1.  No evidence of pulmonary embolus. Nonaneurysmal thoracic aorta with  heavy atherosclerotic plaque and calcification.     2.  4 chamber cardiomegaly with marked hepatic venous reflux of IV  contrast and distended hepatic veins. Findings are suggestive of  congestive heart failure.     3.  Small-to-moderate bilateral pleural effusions with overlying  atelectasis.     4.  Bilateral interlobular septal thickening with faint groundglass  opacity, favored to represent mild interstitial pulmonary edema.     5.  Severe emphysema.     6.  4 mm LEFT upper lobe pulmonary nodule. Recommend a follow-up chest  CT in one year based on Fleischner criteria.  This report was finalized on 05/24/2023 13:52 by Dr. Tony Larios,  MD.    CT Head Without Contrast [[id number]] Collected: 05/24/23 1330     Updated: 05/24/23 1336    Narrative:      EXAMINATION:  CT HEAD WO CONTRAST-  5/24/2023 12:52 PM CDT     HISTORY: Mental status change, unknown cause. CTA chest and CT  abdomen/pelvis also obtained.     TECHNIQUE: Multiple axial images were obtained through the brain without  contrast infusion. Multiplanar images were reconstructed.     DLP: 1149 mGy-cm. Automated dosage reduction technique was utilized to  reduce patient dosage.     COMPARISON: No comparison study.     FINDINGS: There are no hemorrhage, edema or mass effect. There is mild  dilatation of the lateral ventricles that may be developmental or  related to mild atrophy. The paranasal sinuses are clear. The mastoid  air cells are clear. No calvarial fracture is seen. A linear low-density  shading artifact is demonstrated.          Impression:      1. No hemorrhage, edema or mass effect.  2. Mild dilatation of the lateral ventricles may be developmental or  related to mild atrophy.        The full report of this exam was immediately signed and available to the  emergency room. The patient is currently in the emergency room.     This report was finalized on 05/24/2023 13:33 by Dr. Leo Grover MD.    XR Chest 1 View [[id number]] Collected: 05/24/23 1203     Updated: 05/24/23 1207    Narrative:      EXAMINATION: XR CHEST 1 VW-     5/24/2023 11:47 AM CDT     HISTORY: Shortness of breath     1 view chest x-ray.     Cardiomegaly.  Aortic arch calcification.     Chronic interstitial lung disease.     Fully expanded and clear right lung.  Clear left upper lung.     Left lower lobe consolidation compatible with pneumonia and small amount  of pleural fluid.     Summary:  1. Left lower lobe pneumonia.  This report was finalized on 05/24/2023 12:04 by Dr. Adelfo Reddy MD.          Personal review of imaging : CXR shows cardiomegaly with bilateral pulmonary infiltrate and endotracheal tube in  place.  This is most likely pulm edema but left lower lobe pneumonia cannot be ruled out.  Other test results (not lab or imaging): Results for orders placed during the hospital encounter of 05/24/23    Adult Transthoracic Echo Complete W/ Cont if Necessary Per Protocol    Interpretation Summary    Left ventricular systolic function is severely decreased. Left ventricular ejection fraction appears to be 26 - 30%.    The left ventricular cavity is mildly dilated.    Left ventricular diastolic function is consistent with (grade II w/high LAP) pseudonormalization.    Mildly reduced right ventricular systolic function noted.    There is moderate biatrial enlargement.    Moderate to severe mitral valve regurgitation is present with a posteriorly-directed jet noted.    Moderate tricuspid valve regurgitation is present.    Estimated right ventricular systolic pressure from tricuspid regurgitation is normal (<35 mmHg).    Bilateral pleural effusions are noted.  Reviewed and agree with current interpretation  Independent review of ekg: Done    Problem List as identified by Epic (may contain historical, inactive problems)    Sepsis with acute respiratory failure without septic shock, due to unspecified organism, unspecified whether hypoxia or hypercapnia present    CAP (community acquired pneumonia)    Rhabdomyolysis    Nonrheumatic mitral valve insufficiency    Hypothyroidism (acquired)    Moderate Malnutrition (HCC)    Pulmonary Assessment:  SEVERE ACUTE RESPIRATORY FAILURE REQUIRING MECHANICAL VENTILATION.  This is a threat to life or pulmonary function, high risk of morbidity from additional diagnostic testing or treatment, due to sepsis and heart failure  Oral intubation on mechanical ventilation  Sepsis with septic shock requiring pressor  Bacteremia with positive blood culture for gram-positive cocci  Congestive diastolic heart failure  Pleural effusions bilateral  Left lower lobe pneumonia  Chronic obstructive  pulmonary disease history of tobacco abuse  Acute kidney injury  Moderate to severe mitral valve regurgitation and tricuspid regurgitation.  Chronic congestive diastolic heart failure  Chronic malnutrition  Anxiety and depression  Elevated liver enzymes likely shock liver    Recommend/plan:   Ventilator order set, including intravenous narcotics, benzodiazepines (that is controlled drugs) for sedation and ventilator tolerance  Chest X-ray in the morning tomorrow  Arterial blood gas analysis in the morning tomorrow  Additional plan:  Patient presented with pneumonia sepsis and heart failure.  Her respiratory status worsened and she needed intubation and continued mechanical ventilation  Reviewed ventilator settings.  Continue current assist-control volume control ventilation.  Titrate PEEP and FiO2 as tolerated to keep oxygen saturation more than 92%.  Continue current ventilator settings and to support her breathing trial when she is more stable  She is currently sedated with the propofol which will be continued.  She is a smoker and probably has underlying COPD.  I advised to start the patient on Pulmicort and DuoNeb.  Patient has a positive blood culture with gram-positive cocci with very high leukocyte count likely related to underlying pneumonia and sepsis.  MRSA and gram-negative organisms cannot be ruled out.  Antibiotic coverage changed from Rocephin and azithromycin to cefepime and vancomycin.  Urine for Legionella and pneumococcal antigen is ordered.  Respiratory cultures will be sent.  MRSA screen is also ordered  Monitor lab work and adjust antibiotics accordingly.  Patient is requiring Levophed for hypotension which will be titrated  She has elevated liver enzymes likely from shock liver.  We will monitor the liver enzymes closely  To keep mean arterial pressure 65 or above.  IV fluid decreased to 50 cc an hour due to high BNP.  Echocardiogram showed significant mitral and tricuspid elevation but no  significant pulmonary hypertension.  Outpatient cardiology work-up and pulmonary work-up will be needed.  She was an active smoker and will need outpatient PFT for underlying COPD.  Her COVID screening is negative and her vaccination status is unknown most likely she is unvaccinated  Continue DVT and stress ulcer prophylaxis.  Pain and anxiety control.  Repeat labs and imaging studies from pulmonary team.  I we will check a procalcitonin with morning labs  CODE STATUS: Full.  Overall prognosis: Guarded.  We appreciate the consult and we will follow.  Total time spent in seeing this patient was 45 minutes    Thank you for this consult.  We will follow along.    Electronically signed by     Alize Mckeon MD   Pulmonologist/Intensivist   on 5/25/2023 at 19:26 CDT    Electronically signed by Alize Mckeon MD at 05/25/23 2000

## 2023-05-26 NOTE — PROGRESS NOTES
[facility] PULMONARY AND CRITICAL CARE PROGRESS NOTE - [facility]    Patient: Nati Nguyen    1955    MR# [id number]    Acct# [id number]  05/26/23   07:07 CDT  Referring Provider: John Denton,*    Chief Complaint: Mechanically ventilated    Interval history: She remains mechanically ventilated. Tmax is 99.3. Blood culture positive for Bacillus species. WBC increased to 31.48, platelets decreased to 136, Hgb 9.5, Mg 2.4, Phosphorus 4.2, Na 133. RT had difficulty obtaining arterial blood gas last night. Dr. Mcknight was unsuccessful in placing an Art line this am.  ABG this am with pH of 7.131, pCO2 34.9, pO2 90.6, Hco3 11.6. This was felt to be venous and arterial. CXR reviewed. She is on 1.0 FIO2 with sat of 90-92% and ET of 13. Plateau of 17. She is on Propofol but was paused secondary to low BP. RN notes she does not follow commands but does withdraw from pain. She is on Levophed and Dopamine at max doses. BP is 94/50 with MAP of 65. RN states MAP has been around 61. She has had two amps of Bicarb. She has IVF at 50 ml/hr. Review of records appears she has had several Fluid bolus over the last few days. RN states she has been slowly weaning the Levophed. She has been tachycardic. She has no UOP since midnight. Will advance the ETT 1-2 cm.     Meds:  albuterol, 1.25 mg, Nebulization, 4x Daily - RT   And  ipratropium, 0.5 mg, Nebulization, 4x Daily - RT  cefepime, 2 g, Intravenous, Q12H  chlorhexidine, 15 mL, Mouth/Throat, Q12H  Chlorhexidine Gluconate Cloth, 1 application, Topical, Q24H  enoxaparin, 40 mg, Subcutaneous, Q24H  famotidine, 20 mg, Intravenous, BID  levothyroxine, 100 mcg, Oral, Q AM  mupirocin, 1 application, Each Nare, BID  senna-docusate sodium, 2 tablet, Oral, BID  sodium chloride, 10 mL, Intravenous, Q12H  vancomycin, 750 mg, Intravenous, Q12H      DOPamine, 2-20 mcg/kg/min, Last Rate: 20 mcg/kg/min (05/26/23 0536)  norepinephrine, 0.02-0.3 mcg/kg/min, Last Rate: 0.3  mcg/kg/min (05/26/23 0206)  propofol, 5-50 mcg/kg/min, Last Rate: Stopped (05/26/23 0539)  sodium chloride, 50 mL/hr, Last Rate: 50 mL/hr (05/26/23 0624)        Ventilator Settings:        Resp Rate (Set): 28  Pressure Support (cm H2O): 10 cm H20  FiO2 (%): 100 %  PEEP/CPAP (cm H2O): 5 cm H20  Minute Ventilation (L/min) (Obs): 15.2 L/min  Resp Rate (Observed) Vent: 31     I:E Ratio (Obs): 1:2  PIP Observed (cm H2O): 35 cm H2O     Physical Exam:  Temp:  [96.1 °F (35.6 °C)-99.3 °F (37.4 °C)] 99.3 °F (37.4 °C)  Heart Rate:  [] 96  Resp:  [11-30] 30  BP: ()/(15-94) 108/47  FiO2 (%):  [100 %] 100 %  Intake/Output Summary (Last 24 hours) at 5/26/2023 0707  Last data filed at 5/26/2023 0400  Gross per 24 hour   Intake 1201.66 ml   Output 750 ml   Net 451.66 ml     SpO2 Percentage    05/26/23 0615 05/26/23 0630 05/26/23 0645   SpO2: 100% 100% 100%   Body mass index is 20.15 kg/m².   Physical Exam  Vitals and nursing note reviewed.   Constitutional:       General: She is not in acute distress.     Appearance: She is ill-appearing and toxic-appearing. She is not diaphoretic.   HENT:      Head: Normocephalic and atraumatic.   Eyes:      General: No scleral icterus.        Right eye: No discharge.         Left eye: No discharge.   Cardiovascular:      Rate and Rhythm: Regular rhythm. Tachycardia present.      Pulses: Normal pulses.      Heart sounds: Normal heart sounds. No murmur heard.    No friction rub. No gallop.   Pulmonary:      Effort: No respiratory distress.      Breath sounds: No stridor. Rales present. No wheezing.      Comments: Decreased breath sound bilaterally.  Abdominal:      General: Abdomen is flat. Bowel sounds are normal. There is no distension.   Genitourinary:     Comments: Not examined, Singh Cath  Musculoskeletal:         General: No swelling, tenderness or deformity. Normal range of motion.      Cervical back: Normal range of motion and neck supple. No rigidity or tenderness.      Right  lower leg: Edema present.      Left lower leg: Edema present.   Skin:     General: Skin is warm and dry.      Capillary Refill: Capillary refill takes less than 2 seconds.      Coloration: Skin is pale. Skin is not jaundiced. Mottling of feet.      Findings: Bruising present.   Neurological:    Sedation is paused, does not follow commands but does shrug shoulders when asked to squeeze hands. No blink reflex.   Psychiatric:      Comments: Could not be assessed.     Electronically signed by ISHAN Ortega, 5/26/2023, 07:07 CDT       Physician substantive portion: medical decision making  Result Review  Laboratory Data:  Results from last 7 days   Lab Units 05/26/23  0818 05/25/23  0633 05/24/23  1024   WBC 10*3/mm3 28.54* 31.48* 28.60*   HEMOGLOBIN g/dL 8.1* 9.5* 8.7*   PLATELETS 10*3/mm3 85* 136* 167     Results from last 7 days   Lab Units 05/26/23  0818 05/25/23  0832 05/25/23  0633 05/24/23  1324 05/24/23  1024   SODIUM mmol/L 138 133*  --   --  131*   POTASSIUM mmol/L 6.3* 4.8  --   --  5.1   CO2 mmol/L 10.0* 18.0*  --   --  9.0*   BUN mg/dL 50* 52*  --   --  46*   CREATININE mg/dL 1.14* 0.77  --   --  0.92   INR   --   --   --   --  3.71*   LACTATE mmol/L  --   --  3.6*   < > 17.0*    < > = values in this interval not displayed.     Results from last 7 days   Lab Units 05/26/23  0601 05/25/23  2240 05/25/23  1830 05/24/23  1601   PH, ARTERIAL pH units 7.131* 7.189*  --  7.384   PCO2, ARTERIAL mm Hg 34.9* 37.4  --  28.8*   PO2 ART mm Hg 90.6 67.6*  --  143.0*   FIO2 % 100 100 100 80     Microbiology Results (last 10 days)       Procedure Component Value - Date/Time    Legionella Antigen, Urine - Urine, Urine, Clean Catch [[id number]]  (Normal) Collected: 05/26/23 0344    Lab Status: Final result Specimen: Urine, Clean Catch Updated: 05/26/23 0414     LEGIONELLA ANTIGEN, URINE Negative    S. Pneumo Ag Urine or CSF - Urine, Urine, Clean Catch [[id number]]  (Normal) Collected: 05/26/23 0344    Lab Status:  Final result Specimen: Urine, Clean Catch Updated: 05/26/23 0414     Strep Pneumo Ag Negative    MRSA Screen, PCR (Inpatient) - Swab, Nares [[id number]]  (Normal) Collected: 05/25/23 2156    Lab Status: Final result Specimen: Swab from Nares Updated: 05/25/23 2321     MRSA PCR No MRSA Detected    Narrative:      The negative predictive value of this diagnostic test is high and should only be used to consider de-escalating anti-MRSA therapy. A positive result may indicate colonization with MRSA and must be correlated clinically.    Blood Culture - Blood, Hand, Right [[id number]]  (Normal) Collected: 05/25/23 1251    Lab Status: Preliminary result Specimen: Blood from Hand, Right Updated: 05/26/23 1315     Blood Culture No growth at 24 hours    Blood Culture - Blood, Arm, Left [[id number]]  (Abnormal) Collected: 05/24/23 1100    Lab Status: Edited Result - FINAL Specimen: Blood from Arm, Left Updated: 05/26/23 0633     Blood Culture Bacillus species     Isolated from Aerobic Bottle     Gram Stain Aerobic Bottle Gram positive bacilli    Narrative:      Probable contaminant requires clinical correlation, susceptibility not performed unless requested by physician.      Blood culture does not meet the specified criteria for PCR identification.  If pregnant, immunocompromised, or clinical concern for meningitis, call lab to run BCID for Listeria monocytogenes.    Blood Culture - Blood, Arm, Right [[id number]]  (Normal) Collected: 05/24/23 1100    Lab Status: Preliminary result Specimen: Blood from Arm, Right Updated: 05/26/23 1131     Blood Culture No growth at 2 days           Recent films:  Adult Transthoracic Echo Complete W/ Cont if Necessary Per Protocol    Result Date: 5/25/2023    Left ventricular systolic function is severely decreased. Left ventricular ejection fraction appears to be 26 - 30%.   The left ventricular cavity is mildly dilated.   Left ventricular diastolic function is consistent with (grade II w/high  LAP) pseudonormalization.   Mildly reduced right ventricular systolic function noted.   There is moderate biatrial enlargement.   Moderate to severe mitral valve regurgitation is present with a posteriorly-directed jet noted.   Moderate tricuspid valve regurgitation is present.   Estimated right ventricular systolic pressure from tricuspid regurgitation is normal (<35 mmHg).   Bilateral pleural effusions are noted.     XR Chest 1 View    Result Date: 5/26/2023  HISTORY: ETT placement/advancement  CXR: Frontal view the chest obtained  COMPARISON: 05/26/2023  FINDINGS: Endotracheal tube is been advanced. The tip is now positioned approximately 4 cm above the jean. Enteric tube descends into the abdomen. Cardiac pacer pads project over the chest.  Stable left pleural effusion with left lower lobe opacities. Similar prominence of interstitial markings. No pneumothorax. Thoracic aortic calcification. Probable right basilar atelectasis. Old healed right ninth anterolateral rib fracture.      Impression: 1. Advancement of the endotracheal tube with tip now 4 cm above the jean. Otherwise stable chest. This report was finalized on 05/26/2023 10:38 by Dr. Mary Gaxiola MD.    XR Chest 1 View    Result Date: 5/26/2023  EXAM/TECHNIQUE: XR CHEST 1 VW-  INDICATION: Intubated Patient; A41.9-Sepsis, unspecified organism; R65.20-Severe sepsis without septic shock; J96.00-Acute respiratory failure, unspecified whether with hypoxia or hypercapnia; J18.9-Pneumonia, unspecified organism; R77.8-Other specified abnormalities of plasma proteins; R41.82-Altered mental status, unspecified; R13.11-Dysphagia, oral phase  COMPARISON: Prior day  FINDINGS:  Endotracheal tube is 7.5 cm above the jean. Enteric tube terminates below the diaphragm out of the field of view. Resuscitation pads overlie the chest.  Cardiac silhouette is enlarged but stable. No change in bilateral pleural effusions with overlying atelectasis, greatest on the LEFT.  No new airspace opacity. No visible pneumothorax. Decreased diffuse interstitial opacity.      Impression:  1.  Endotracheal tube is 7.5 cm above the jean. Consider slight advancement. 2.  Decrease in interstitial opacity/edema. 3.  No change in bilateral pleural effusions with overlying atelectasis, greatest on the LEFT. This report was finalized on 05/26/2023 07:55 by Dr. Tony Larios MD.    XR Chest 1 View    Result Date: 5/25/2023  Frontal upright radiograph of the chest 5/25/2023 6:42 PM CDT  HISTORY: Status post intubation  COMPARISON: Chest x-ray dated 05/24/2023.  FINDINGS:  Endotracheal tube is in good position. Prominent cardiomegaly. Bilateral interstitial coarsening and hazy perihilar airspace opacities suggesting bilateral pulmonary edema. Bilateral trace layering pleural effusions. No pneumothorax. Enteric tube courses below the diaphragm with tip beyond the field of view.      Impression: 1. Endotracheal tube is in good position. Lungs well-expanded. 2. Cardiomegaly with bilateral pulmonary edema and layering trace pleural effusions.   This report was finalized on 05/25/2023 19:09 by Dr Fco Cosby, .    XR Abdomen KUB    Result Date: 5/25/2023  XR ABDOMEN KUB- 5/25/2023 6:43 PM CDT  HISTORY: OG; A41.9-Sepsis, unspecified organism; R65.20-Severe sepsis without septic shock; J96.00-Acute respiratory failure, unspecified whether with hypoxia or hypercapnia; J18.9-Pneumonia, unspecified organism; R77.8-Other specified abnormalities of plasma proteins; R41.82-Altered mental status, unspecified; R13.11-Dysphagia, oral phase  FINDINGS:  Feeding tube is seen with the tip located in the upper gastric body.  Moderate gastric distention. No gross free air in the upper abdomen. Multiple air-filled small bowel loops in the upper and mid abdomen.      Impression: Enteric tube tip located in the upper gastric body.  This report was finalized on 05/25/2023 19:02 by Dr Fco Cosby, .    Personal review of  imaging : CXR shows bilateral pulmonary infiltrate cardiomegaly and endotracheal tube in place.      Pulmonary Assessment:    Acute hypoxic respiratory failure acute  Oral intubation on mechanical ventilation  Severe sepsis with septic shock and multiple organ failure  Bacteremia with positive blood culture for gram-positive cocci  Congestive diastolic heart failure, bilateral pleural effusion  Severe mitral regurgitation with low ejection fraction possible acute MI  Left lower lobe pneumonia leading to sepsis and septic shock  Chronic obstructive pulmonary disease history of tobacco abuse  Acute kidney injury  Moderate to severe mitral valve regurgitation and tricuspid regurgitation.  Chronic congestive diastolic heart failure  Chronic malnutrition  Anxiety and depression  Elevated liver enzymes likely shock liver    Recommend/plan:   Patient was seen by ISHAN Boss this morning.  I reviewed the chart and current events noted.  I discussed with hospitalist .  Patient was a mc of [facility] and this morning her condition has significantly tolerated  She has acute kidney injury elevated liver enzymes and echocardiogram shows low cardiac ejection fraction suggesting severe cardiomyopathy  In addition she was in septic shock on pressors and was on high level of ventilator support with high PEEP and FiO2  Her power of  was contacted and they decided to transition her to hospice care accordingly patient was terminally extubated and comfort measures were initiated.  Patient passed away in her room.  We appreciate the consult and like to thank the hospitalist team for the referral      This visit was performed by both a physician and an [facility] Practice RN.  I performed all aspects of the medical decision making as documented.    Electronically signed by     Alize Mckeon MD,  Pulmonologist/Intensivist   5/26/2023, 16:55 CDT

## 2023-05-26 NOTE — SIGNIFICANT NOTE
05/26/23 0605   Readings   Plateau Pressure (cm H2O) 17 cm H2O   Driving Pressure (cm H2O) 11.3 cm H2O   Static Compliance (L/cm H2O) 43

## 2023-05-26 NOTE — PLAN OF CARE
Goal Outcome Evaluation:         Initial day of ventilation.  No new issues / concerns at this time.

## 2023-05-26 NOTE — PROGRESS NOTES
"Pharmacy Renal Dose Conversion    Nati Nguyen is a 68 y.o. year old female  162.6 cm (64\") 53.3 kg (117 lb 6.4 oz)    Estimated Creatinine Clearance: 40.3 mL/min (A) (by C-G formula based on SCr of 1.14 mg/dL (H)).   Creatinine   Date Value Ref Range Status   05/26/2023 1.14 (H) 0.57 - 1.00 mg/dL Final   05/25/2023 0.77 0.57 - 1.00 mg/dL Final   05/24/2023 0.92 0.57 - 1.00 mg/dL Final       PLAN  Based on prescribing information provided by the drug , Famotiodine 20mg iv Q12H,  has been changed to Famotidine 20mg iv daily.    Adjusted per the directives and guidelines established by [facility] Pharmacy and Therapeutics Committee and [facility] Medical Executive Committee.  Pharmacy will continue to monitor daily and make further adjustment(s) accordingly.    Tony Brown, PharmD  05/26/2313:37 CDT    "

## 2023-05-26 NOTE — CONSULTS
Patient or patient's representative gives informed consent after an explanation of the risks (air embolism; catheter occlusion; phlebitis; catheter infection; bloodstream infection; vein thrombosis; hematoma/bleeding at the insertion site; slight discomfort; accidental puncture of an artery, nerve, or tendon; dislodging of device), benefits (longer dwell time, outpatient treatment, medications that cannot run peripherally), and alternatives (short peripheral catheter, centrally inserted central line, or permanent catheter) of PICC placement. Time provided to ask and answer questions.    Pt had 5 [place]riot TRIPLE lumen PICC placed in LEFT BASILIC vein. Pt tolerated procedure WELL. 37 cm of catheter internal and 1 cm external. Tip confirmation by 3CG. All lumens flush and draw well. Sterile dressing applied.

## 2023-05-26 NOTE — PLAN OF CARE
Goal Outcome Evaluation:      Pt alert but does not follow commands. Withdraws from pain. Patient restless, propofol paused due to low BP. Maxed out on levo and dopamine. BP have been wildly inconsistent even after repositioning cuff multiple times with no change in presentation of patient. Notified Dr. Mcknight, who was unsuccessful in placing an art line. Lab and respiratory have also been unsuccessful numerous times in drawing blood. Tachycardic with HR between 100-110 for most of shift. Max temp 99.5 axillary. No UOP since 0000 today, mottling present in feet with no pedal pulses detectable on doppler, notified Dr. Mcknight. Patient not taken to CT due to unstable condition.

## 2023-05-26 NOTE — PROGRESS NOTES
"Pharmacy Dosing Service  Pharmacokinetics  Vancomycin Initial Evaluation  Assessment/Action/Plan:  Loading dose?: N/A  Current Order: Vancomycin 750 mg IVPB every 12 hours x 7 days  Current end date: 06/01/2023  Levels: Obtain Vancomycin trough prior to dose on 05/27/2023 at 08:00 am  Additional antimicrobial agent(s): Cefepime    Vancomycin dosage initiated based on population pharmacokinetic parameters. Pharmacy will continue to follow daily and adjust dose accordingly.     Subjective:  Nati Nguyen is a 68 y.o. female with a Vancomycin \"Pharmacy to Dose\" consult for the treatment of Pneumonia/Sepsis , day 1 of 7 of treatment.    AUC Model Data:  Loading dose: N/A  Regimen: 750 mg IV every 12 hours.  Start time: 21:00 on 05/25/2023  Exposure target: AUC24 (range) 400-600 mg/L.hr   AUC24,ss: 519 mg/L.hr  PAUC*: 87 %  Ctrough,ss: 14.4 mg/L  Pconc*: 15 %  Tox.: 10 %    Objective:  Ht: 162.6 cm (64\"); Wt: 53.3 kg (117 lb 6.4 oz)  Estimated Creatinine Clearance: 59.7 mL/min (by C-G formula based on SCr of 0.77 mg/dL).   Creatinine   Date Value Ref Range Status   05/25/2023 0.77 0.57 - 1.00 mg/dL Final   05/24/2023 0.92 0.57 - 1.00 mg/dL Final      Lab Results   Component Value Date    WBC 31.48 (C) 05/25/2023    WBC 28.60 (H) 05/24/2023      Baseline culture results:  Microbiology Results (last 10 days)       Procedure Component Value - Date/Time    Blood Culture - Blood, Arm, Left [[id number]]  (Abnormal) Collected: 05/24/23 1100    Lab Status: Preliminary result Specimen: Blood from Arm, Left Updated: 05/25/23 1138     Blood Culture Abnormal Stain     Gram Stain Aerobic Bottle Gram positive bacilli    Narrative:      Blood culture does not meet the specified criteria for PCR identification.  If pregnant, immunocompromised, or clinical concern for meningitis, call lab to run BCID for Listeria monocytogenes.    Blood Culture - Blood, Arm, Right [[id number]]  (Normal) Collected: 05/24/23 1100    Lab Status: Preliminary " result Specimen: Blood from Arm, Right Updated: 05/25/23 1116     Blood Culture No growth at 24 hours            Li Brown, PharmD  05/25/23 20:07 CDT

## 2023-05-26 NOTE — PLAN OF CARE
Goal Outcome Evaluation: based on the condition of the patient, pallitive care has been consulted, and the decision was made to withdraw care and provide comfort.  See MAR.           Progress: declining            Problem: Adult Inpatient Plan of Care  Goal: Plan of Care Review  Outcome: Unable to Meet, Plan Revised  Flowsheets (Taken 5/26/2023 1319)  Progress: declining  Goal: Patient-Specific Goal (Individualized)  Outcome: Unable to Meet, Plan Revised  Goal: Absence of Hospital-Acquired Illness or Injury  Outcome: Unable to Meet, Plan Revised  Goal: Optimal Comfort and Wellbeing  Outcome: Unable to Meet, Plan Revised  Goal: Readiness for Transition of Care  Outcome: Unable to Meet, Plan Revised     Problem: Fall Injury Risk  Goal: Absence of Fall and Fall-Related Injury  Outcome: Unable to Meet, Plan Revised     Problem: Adjustment to Illness (Sepsis/Septic Shock)  Goal: Optimal Coping  Outcome: Unable to Meet, Plan Revised     Problem: Bleeding (Sepsis/Septic Shock)  Goal: Absence of Bleeding  Outcome: Unable to Meet, Plan Revised     Problem: Glycemic Control Impaired (Sepsis/Septic Shock)  Goal: Blood Glucose Level Within Desired Range  Outcome: Unable to Meet, Plan Revised     Problem: Infection Progression (Sepsis/Septic Shock)  Goal: Absence of Infection Signs and Symptoms  Outcome: Unable to Meet, Plan Revised     Problem: Nutrition Impaired (Sepsis/Septic Shock)  Goal: Optimal Nutrition Intake  Outcome: Unable to Meet, Plan Revised     Problem: Skin Injury Risk Increased  Goal: Skin Health and Integrity  Outcome: Unable to Meet, Plan Revised     Problem: Malnutrition  Goal: Improved Nutritional Intake  Outcome: Unable to Meet, Plan Revised     Problem: Restraint, Nonviolent  Goal: Absence of Harm or Injury  Outcome: Unable to Meet, Plan Revised     Problem: Communication Impairment (Mechanical Ventilation, Invasive)  Goal: Effective Communication  Outcome: Unable to Meet, Plan Revised     Problem:  Device-Related Complication Risk (Mechanical Ventilation, Invasive)  Goal: Optimal Device Function  Outcome: Unable to Meet, Plan Revised     Problem: Inability to Wean (Mechanical Ventilation, Invasive)  Goal: Mechanical Ventilation Liberation  Outcome: Unable to Meet, Plan Revised     Problem: Nutrition Impairment (Mechanical Ventilation, Invasive)  Goal: Optimal Nutrition Delivery  Outcome: Unable to Meet, Plan Revised     Problem: Skin and Tissue Injury (Mechanical Ventilation, Invasive)  Goal: Absence of Device-Related Skin and Tissue Injury  Outcome: Unable to Meet, Plan Revised     Problem: Ventilator-Induced Lung Injury (Mechanical Ventilation, Invasive)  Goal: Absence of Ventilator-Induced Lung Injury  Outcome: Unable to Meet, Plan Revised

## 2023-05-26 NOTE — CONSULTS
[facility] Palliative Care Services    Palliative Care Initial Consult   Attending Physician: John Denton,*  Referring Provider: John Denton MD    Reason for Referral: assistance with clarification of goals of care  Family/Support: public guardian program    Code Status and Medical Interventions:   Ordered at: 05/24/23 1540     Code Status (Patient has no pulse and is not breathing):    CPR (Attempt to Resuscitate)     Medical Interventions (Patient has pulse or is breathing):    Full Support     Goals of Care: TBD.    HPI:   68 y.o. female has a past medical history of hypertension, hypothyroidism, tobacco use, and COPD. Patient presented to [facility] on 5/24/2023 related to lethargy.  According to chart review patient had not been seen over the course of the last 7 days and POA asked for a well visit of which patient was found to be sitting in her chair, staring, and listless with O2 saturation in the 60s.  ED work-up showed lactate 17, elevated troponin/CK/BNP/D-dimer, hyponatremia, transaminitis, elevated alk phos, leukocytosis, anemia, and ABGs show acidosis.  CT of the abdomen pelvis showed hypoperfusion complex may be due to hypotension or septic shock, severe hepatic steatosis, severe atheromatous changes of abdominal aorta and iliac arteries, significant stenosis of proximal celiac and right renal artery, abdominal and pelvic fluid, thickening of small and large bowel concerning for inflammatory versus ischemic process, significantly dilated and tortuous utero ovarian veins and significant large and distended both bowel may represent pelvic congestion syndrome or component of hypoperfusion complex.  CT of the chest negative for PE, four-chamber cardiomegaly with marked hepatic venous reflux suggestive of congestive heart failure, small to moderate bilateral pleural effusions, bilateral interlobular septal thickening likely representing interstitial  pulmonary edema, severe emphysema, and a 4 mm left upper lobe pulmonary nodule.  CT of the head negative for acute findings.  He received sepsis bolus in the ED and given empiric antibiotics.  Blood cultures pending.  Supplemental oxygen.  Admitted and transferred to critical care unit.  Echocardiogram showed EF 26 to 30%, diastolic dysfunction, moderate biatrial enlargement, moderate to severe mitral valve regurgitation, moderate tricuspid valve regurgitation, and bilateral pleural effusions.  Significant decline noted yesterday and required intubation and vasopressor support.  Pulmonology consulted for ventilator management.  Attempts made for art line placement without success overnight.  Blood cultures positive for bacillus species.  Worsening leukocytosis.  Troponin trending upward.  Significant hypoglycemia today.  Elevated potassium 6.3. No urine output since midnight.  Remains on ventilator support, FiO2 100%.  Remains on vasopressor support with dopamine and Levophed.  Pedal pulses requiring Doppler and mottling noted to mid thigh.  Myoclonus noted. No corneal reflex to threat. Her sisters are at bedside. Palliative Care Spoke With: family and other public guardianLo   Due to the Palliative Care Topics Discussed: palliative care, goals of care, care options, and resuscitation status we will establish an advance care plan.   Advance Care Planning   Advance Care Planning Discussion:  Spoke with public guardianLo via telephone and patient's sisters Luda and Leah who are currently in the room.  Explored events leading to current hospitalization, as well as, patient's overall poor long-term prognosis secondary to sepsis with shock and multiorgan failure, respiratory failure, acute congestive heart failure, acute kidney failure, severe emphysema, severe hepatic steatosis, and comorbidities. Patient and family apparently estranged but contacted by public guardian given patient's poor prognosis.   Discussed ongoing needs for aggressive life-prolonging measures including high potential of hemodialysis, though discussed unlikely patient would tolerate given need for multiple vasopressor support adjuvants currently in place.  Discussed options for de-escalation of care measures with focus of comfort as likelihood that further aggressive care interventions at this juncture would impose great discomfort and unlikely treatment or procedure will return her health or provide any formulation of quality of life. In addition, any escalation in care with aggressive life sustaining or surgical measures (e. g. cardioversion/defibrillation, dialysis, PEG tube etc.) would pose an extreme imposition upon his dignity with little to no improvement in the quality of his life. Lo public guardian program representative has spoken with her medical director and agrees with family's decision to de-escalate care measures with focus of comfort.   to provide spiritual support.       Review of Systems   Unable to perform ROS: Intubated     1- Pain Assessment  CPOT Facial Expression: 1-->tense  CPOT Body Movements: 2-->restlessness  CPOT Muscle Tension: 1-->tense, rigid  Ventilator Compliance/Vocalization: 0-->tolerating ventilator or movement  CPOT Score: 4    History reviewed. No pertinent past medical history.  History reviewed. No pertinent surgical history.  Social History     Socioeconomic History    Marital status: Single   Tobacco Use    Smoking status: Every Day     Types: Cigarettes   Vaping Use    Vaping Use: Unknown   Substance and Sexual Activity    Alcohol use: Not Currently    Drug use: Never    Sexual activity: Defer       Current Facility-Administered Medications   Medication Dose Route Frequency Provider Last Rate Last Admin    albuterol (PROVENTIL) nebulizer solution 0.5% 2.5 mg/0.5mL  1.25 mg Nebulization 4x Daily - RT Alize Mckeon MD   1.25 mg at 05/26/23 1011    And    ipratropium (ATROVENT)  nebulizer solution 0.5 mg  0.5 mg Nebulization 4x Daily - RT Alize Mckeon MD   0.5 mg at 05/26/23 1011    sennosides-docusate (PERICOLACE) 8.6-50 MG per tablet 2 tablet  2 tablet Oral BID John Denton MD   2 tablet at 05/26/23 0817    And    polyethylene glycol (MIRALAX) packet 17 g  17 g Oral Daily PRN John Denton MD        And    bisacodyl (DULCOLAX) EC tablet 5 mg  5 mg Oral Daily PRN John Denton MD        And    bisacodyl (DULCOLAX) suppository 10 mg  10 mg Rectal Daily PRN John Denton MD        cefepime 2 gm IVPB in 100 ml NS (MBP)  2 g Intravenous Q12H Alize Mckeon MD   2 g at 05/26/23 0841    chlorhexidine (PERIDEX) 0.12 % solution 15 mL  15 mL Mouth/Throat Q12H John Denton MD   15 mL at 05/26/23 0858    Chlorhexidine Gluconate Cloth 2 % pads 1 application  1 application Topical Q24H John Denton MD   1 application at 05/26/23 0349    dextrose 5 % 850 mL with sodium bicarbonate 8.4 % 150 mEq infusion   Intravenous Continuous John Denton MD 50 mL/hr at 05/26/23 1155 New Bag at 05/26/23 1155    DOPamine 400 mg in 250 mL D5W infusion  2-20 mcg/kg/min Intravenous Titrated Holger Mcknight DO 40 mL/hr at 05/26/23 1147 20 mcg/kg/min at 05/26/23 1147    Enoxaparin Sodium (LOVENOX) syringe 40 mg  40 mg Subcutaneous Q24H John Denton MD        famotidine (PEPCID) injection 20 mg  20 mg Intravenous BID John Denton MD   20 mg at 05/26/23 0817    levothyroxine (SYNTHROID, LEVOTHROID) tablet 100 mcg  100 mcg Oral Q AM John Denton MD   100 mcg at 05/26/23 0548    lidocaine (XYLOCAINE) 1 % injection 10 mL  10 mL Infiltration Once John Denton MD        mupirocin (BACTROBAN) 2 % nasal ointment 1 application  1 application Each Nare BID John Denton MD   1 application at 05/26/23 0817    nitroglycerin (NITROSTAT) SL tablet 0.4 mg  0.4 mg Sublingual Q5 Min PRN  John Denton MD        norepinephrine (LEVOPHED) 8 mg in 250 mL NS infusion (premix)  0.02-0.3 mcg/kg/min Intravenous Titrated John Denton MD 24 mL/hr at 05/26/23 1143 0.24 mcg/kg/min at 05/26/23 1143    ondansetron (ZOFRAN) injection 4 mg  4 mg Intravenous Q6H PRN John Denton MD   4 mg at 05/24/23 1841    propofol (DIPRIVAN) infusion 10 mg/mL 100 mL  5-50 mcg/kg/min Intravenous Titrated John Denton MD   Stopped at 05/26/23 0539    sodium bicarbonate injection 8.4% 50 mEq  50 mEq Intravenous BID John Denton MD   50 mEq at 05/26/23 1104    sodium chloride 0.9 % flush 10 mL  10 mL Intravenous Q12H John Denton MD   10 mL at 05/26/23 0819    sodium chloride 0.9 % flush 10 mL  10 mL Intravenous PRN John Denton MD        sodium chloride 0.9 % infusion 40 mL  40 mL Intravenous PRN John Denton MD        vancomycin 750 mg/250 mL 0.9% NS IVPB ([facility])  750 mg Intravenous Q12H Alize Mckeno MD   750 mg at 05/26/23 0857     custom IV infusion builder, , Last Rate: 50 mL/hr at 05/26/23 1155  DOPamine, 2-20 mcg/kg/min, Last Rate: 20 mcg/kg/min (05/26/23 1147)  norepinephrine, 0.02-0.3 mcg/kg/min, Last Rate: 0.24 mcg/kg/min (05/26/23 1143)  propofol, 5-50 mcg/kg/min, Last Rate: Stopped (05/26/23 0539)        senna-docusate sodium **AND** polyethylene glycol **AND** bisacodyl **AND** bisacodyl    nitroglycerin    ondansetron    sodium chloride    sodium chloride    Allergies   Allergen Reactions    Aspirin Unknown - Low Severity     Attest that current medications reviewed  including but not limited to prescriptions, over-the counter, herbals and vitamin/mineral/dietary (nutritional) supplements for name, route of administration, type, dose and frequency and are current using all immediate resources available at time of dictation.      Intake/Output Summary (Last 24 hours) at 5/26/2023 1252  Last data filed at 5/26/2023  "0400  Gross per 24 hour   Intake 495.66 ml   Output 750 ml   Net -254.34 ml       Physical Exam:    Diagnostics: Reviewed  BP (!) 67/42   Pulse 89   Temp 99.3 °F (37.4 °C) (Axillary)   Resp (!) 31   Ht 162.6 cm (64\")   Wt 53.3 kg (117 lb 6.4 oz)   SpO2 92%   BMI 20.15 kg/m²     Vitals and nursing note reviewed.   Constitutional:       Appearance: Toxic-appearing and acutely ill-appearing.      Interventions: Intubated and restrained.      Comments: Vasopressors dopamine and Levophed.  Bicarb drip.   Eyes:      Comments: Open and unblinking   HENT:      Head: Normocephalic.   Pulmonary:      Effort: Intubated.      Comments: FiO2 100%  Cardiovascular:      Normal rate.   Edema:     Peripheral edema present.  Skin:     General: Skin is warm.      Comments: Mottling noted from feet to mid thigh   Genitourinary:     Comments: Singh catheter in place.  Neurological:      Comments: Obtunded.  Does not blink to visual threat.     Patient status: Disease state: Deteriorating despite treatments.  Current Functional status: Palliative Performance Scale Score: Performance 10% based on the following measures: Ambulation: Totally bed bound, Activity and Evidence of Disease: Unable to do any work, extensive evidence of disease, Self-Care: Total care required,  Intake: Mouth care only, LOC: Drowsy or comatose   Baseline Functional status: Palliative Performance Scale Score: Unknown  Nutritional status: Albumin 3.7 Body mass index is 20.15 kg/m².         Hospital Problem List      Sepsis with acute respiratory failure without septic shock, due to unspecified organism, unspecified whether hypoxia or hypercapnia present    CAP (community acquired pneumonia)    Rhabdomyolysis    Nonrheumatic mitral valve insufficiency    Hypothyroidism (acquired)    Moderate Malnutrition (HCC)    Shock liver    Coronary artery disease involving native coronary artery of native heart with angina pectoris    OLMAN (acute kidney " injury)    Impression/Problem List:    Sepsis with shock and multiorgan failure  Acute respiratory failure  Bacteremia  Acute combined systolic and diastolic congestive heart failure, EF 26 to 30%  Pleural effusions, bilateral  Left lower lobe pneumonia  Acute kidney injury  Elevated troponin  Moderate to severe mitral valve regurgitation and tricuspid regurgitation  Transaminitis  COPD, severe emphysema per CT  Hyperkalemia  Malnutrition  Hypothyroidism  Tobacco use  Hepatic steatosis, severe  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 plan: Goals of care include CODE STATUS no CPR/comfort measures.    Family support: The patient lacks significant family support..  Advance Directives: Advance Directive Status: Patient has advance directive, copy requested   POA/Healthcare surrogate-public guardian system-POA.    2.  Palliative care encounter  - Prognosis is poor long-term secondary to sepsis with shock and multiorgan failure, respiratory failure, acute congestive heart failure, acute kidney failure, severe emphysema, severe hepatic steatosis, and comorbidities.  -Public guardian and Family appears to have good prognostic awareness.     -received sepsis bolus in the ED and given empiric antibiotics.  Blood cultures positive for bacillus species.    -Intubated 5/25. Pulmonology consulted for ventilator management.   -Echocardiogram showed EF 26 to 30%, diastolic dysfunction, moderate biatrial enlargement, moderate to severe mitral valve regurgitation, moderate tricuspid valve regurgitation, and bilateral pleural effusions.   -vasopressor support.   -Attempts made for art line placement without success overnight.     5/26-Worsening leukocytosis.  Troponin trending upward.  Significant hypoglycemia today.  Elevated potassium 6.3. No urine output since midnight.  Remains on ventilator support.  Remains on vasopressor support.  Pedal pulses  doppler.    -Public guardian has elected to de-escalate care measures with focus of comfort.  Family in agreement to plan.  - notified for spiritual support    3.  Comfort care  -We will plan to discontinue any treatments and adjuvants not line with comfort  -Plan palliative extubation  -We will premedicate with morphine and Ativan IV prior to extubation then as needed  -Additional palliative adjuvants as needed    Thank you for this consult and allowing us to participate in patient's plan of care. Palliative Care Team will continue to follow patient.     18 minutes spent on advance care planning-discussing with patient and/or family, answering questions, providing some guidance about a plan and documentation of care, and coordinating care face to face.    Sally Campbell, APRN  5/26/2023  12:52 CDT

## 2023-05-27 NOTE — DISCHARGE SUMMARY
[facility] Medicine Services  DEATH SUMMARY       Date of Admission: 5/24/2023  Date of Death:  5/23/2023 at approximately 1449  Primary Care Physician: Josué Galicia MD    Presenting Problem/History of Present Illness:  Sepsis with acute respiratory failure without septic shock, due to unspecified organism, unspecified whether hypoxia or hypercapnia present [A41.9, R65.20, J96.00]     Final Death Diagnoses:  Active Hospital Problems    Diagnosis     **Sepsis with acute respiratory failure without septic shock, due to unspecified organism, unspecified whether hypoxia or hypercapnia present     Shock liver     OLMAN (acute kidney injury)     Moderate Malnutrition (HCC)     CAP (community acquired pneumonia)     Rhabdomyolysis     Nonrheumatic mitral valve insufficiency     Hypothyroidism (acquired)     Coronary artery disease involving native coronary artery of native heart with angina pectoris        Consults:   Dr Mckeon, pulmonology    Hospital Course:  The patient is a 68 y.o. female who [facility] to [facility] with sepsis from pneumonia, septic shock and acute systolic congestive heart failure. She was admitted to critical care and despite treatment measures continued to decompensate quickly. She was intubated and ventilated, pressors maxed out.     Vital organs like liver, kidney and myocardium showed signs of worsening failure and after discussions with guardian, it was requested that the patient be switched to comfort measures. The patient had very poor prognosis and unlikely to recover, thus we proceeded with comfort care.     She was extubated and kept comfortable, passing away on 5/26/2023 at 1449.       Electronically signed by John Denton MD, 05/27/23, 08:41 CDT.    Time: 41 minutes.

## 2023-05-28 LAB
BACTERIA SPEC AEROBE CULT: ABNORMAL
GRAM STN SPEC: ABNORMAL

## 2023-05-30 LAB
BACTERIA SPEC AEROBE CULT: ABNORMAL
BACTERIA SPEC AEROBE CULT: NORMAL
GRAM STN SPEC: ABNORMAL
ISOLATED FROM: ABNORMAL